# Patient Record
Sex: FEMALE | Race: WHITE
[De-identification: names, ages, dates, MRNs, and addresses within clinical notes are randomized per-mention and may not be internally consistent; named-entity substitution may affect disease eponyms.]

---

## 2017-12-16 ENCOUNTER — HOSPITAL ENCOUNTER (EMERGENCY)
Dept: HOSPITAL 41 - JD.ED | Age: 82
Discharge: HOME | End: 2017-12-16
Payer: MEDICARE

## 2017-12-16 DIAGNOSIS — Z79.899: ICD-10-CM

## 2017-12-16 DIAGNOSIS — Z88.0: ICD-10-CM

## 2017-12-16 DIAGNOSIS — Z88.8: ICD-10-CM

## 2017-12-16 DIAGNOSIS — M54.41: Primary | ICD-10-CM

## 2017-12-16 PROCEDURE — 73502 X-RAY EXAM HIP UNI 2-3 VIEWS: CPT

## 2017-12-16 PROCEDURE — 72100 X-RAY EXAM L-S SPINE 2/3 VWS: CPT

## 2017-12-16 PROCEDURE — 72131 CT LUMBAR SPINE W/O DYE: CPT

## 2017-12-16 PROCEDURE — 99284 EMERGENCY DEPT VISIT MOD MDM: CPT

## 2017-12-16 NOTE — EDM.PDOC
ED HPI GENERAL MEDICAL PROBLEM





- General


Chief Complaint: Back Pain or Injury


Stated Complaint: BACK PAIN


Time Seen by Provider: 12/16/17 14:17


Source of Information: Reports: Patient


History Limitations: Reports: No Limitations





- History of Present Illness


INITIAL COMMENTS - FREE TEXT/NARRATIVE: 





87 y/o F with back pain.  Has had pain for three years but it's been much worse 

for the past 2 weeks.  No known injury.  Pain is located in lower back and is 

worse on the R side.  Sometimes radiates down R leg.  Off and on, worse with 

movement, difficult to get out of bed in the morning due to pain.  No leg 

weakness or numbness. Is still able to walk.  Taking advil and cyclobenzaprine 

which provides partial relief.  Came to ED today because pain has been 

worsening.  No recent illness.


  ** Lower Back


Pain Score (Numeric/FACES): 7





- Related Data


 Allergies











Allergy/AdvReac Type Severity Reaction Status Date / Time


 


aspirin Allergy  Cannot Verified 12/16/17 14:19





   Remember  


 


paroxetine [From Paxil] Allergy  Cannot Verified 12/16/17 14:19





   Remember  


 


Penicillins Allergy  Cannot Verified 12/16/17 14:19





   Remember  











Home Meds: 


 Home Meds





Cyclobenzaprine [Flexeril] 5 mg PO ASDIRECTED PRN 12/16/17 [History]


Flecainide Acetate 50 mg PO BID 12/16/17 [History]


Metoprolol Succinate [Toprol XL] 25 mg PO DAILY 12/16/17 [History]


Non-Formulary Medication [NF Drug] 30 mcg PO DAILY 12/16/17 [History]


traMADol [Ultram] 50 mg PO TID PRN #30 tablet 12/16/17 [Rx]











Past Medical History


Cardiovascular History: Reports: Afib


Musculoskeletal History: Reports: Arthritis





- Past Surgical History


GI Surgical History: Reports: Appendectomy


Female  Surgical History: Reports: Hysterectomy, Other (See Below)


Other Female  Surgeries/Procedures: lumpectomy





Social & Family History





- Tobacco Use


Smoking Status *Q: Never Smoker


Second Hand Smoke Exposure: No





- Caffeine Use


Caffeine Use: Reports: Coffee





- Recreational Drug Use


Recreational Drug Use: No





ED ROS GENERAL





- Review of Systems


Review Of Systems: See Below


Constitutional: Reports: No Symptoms


Respiratory: Reports: No Symptoms


Cardiovascular: Reports: No Symptoms


GI/Abdominal: Reports: No Symptoms


Musculoskeletal: Reports: Back Pain


Neurological: Denies: Paresthesia, Difficulty Walking, Weakness





ED EXAM,LOWER BACK PAIN/INJURY





- Physical Exam


Exam: See Below


Exam Limited By: No Limitations


General Appearance: Alert, WD/WN, No Apparent Distress


Eye Exam: Bilateral Eye: Normal Inspection


Ears: Normal External Exam


Nose: Normal Inspection


Throat/Mouth: Normal Inspection, Normal Voice, No Airway Compromise


Head: Atraumatic, Normocephalic


Neck: Normal Inspection, Supple, Non-Tender, Full Range of Motion


Respiratory/Chest: No Respiratory Distress, Lungs Clear, Normal Breath Sounds


Cardiovascular: Normal Peripheral Pulses, Regular Rate, Rhythm, No Murmur


GI/Abdominal: Soft, Non-Tender, No Distention.  No: Rebound


Back Exam: Normal Inspection, Vertebral Tenderness (mild, diffuse lumbar area, 

no step-offs or deformities, skin normal.  +paraspinal TTP throughout lower 

lumbar area. straight leg raise neg bilat. )


Extremities: Normal Inspection, No Pedal Edema


Neurological: Alert, Normal Mood/Affect, Normal Dorsiflexion, Normal Plantar 

Flexion, No Motor/Sensory Deficits, Oriented x 3


Psychiatric: Normal Affect, Normal Mood


Skin Exam: Warm, Dry, Intact, Normal Color, No Rash





Course





- Vital Signs


Last Recorded V/S: 


 Last Vital Signs











Temp  36.8 C   12/16/17 14:16


 


Pulse  77   12/16/17 14:16


 


Resp  18   12/16/17 14:16


 


BP  140/81   12/16/17 14:16


 


Pulse Ox  96   12/16/17 14:16














- Orders/Labs/Meds


Orders: 


 Active Orders 24 hr











 Category Date Time Status


 


 Hip Min 2V or 3V w Pelvis Rt [CR] Stat Exams  12/16/17 14:59 Taken


 


 Lumbar Spine 2 or 3V [CR] Stat Exams  12/16/17 14:59 Taken











Meds: 


Medications














Discontinued Medications














Generic Name Dose Route Start Last Admin





  Trade Name Freq  PRN Reason Stop Dose Admin


 


Tramadol HCl  50 mg  12/16/17 14:59  12/16/17 15:04





  Ultram  PO  12/16/17 15:00  50 mg





  ONETIME ONE   Administration














- Re-Assessments/Exams


Free Text/Narrative Re-Assessment/Exam: 





12/16/17 17:22


XR L spine shows diffuse compression deformities and degenerative disease of 

lumbar spine.  CT scan also shows several compression deformities, acuity 

unknown.  Patient feels better after tramadol.  She had MRI scheduled for a 

couple of days ago but was in too much pain so cancelled it.  I encouraged her 

to f/u with her primary care provider this week to reschedule MRI and also 

discuss possible physical therapy.  meanwhile will rx tramadol, discussed risks 

at length and encouraged her to reserve it for severe pain symptoms. 





Departure





- Departure


Time of Disposition: 17:06


Disposition: Home, Self-Care 01


Clinical Impression: 


Back pain


Qualifiers:


 Back pain location: low back pain Chronicity: acute Back pain laterality: 

bilateral Sciatica presence: with sciatica Sciatica laterality: sciatica of 

right side Qualified Code(s): M54.41 - Lumbago with sciatica, right side








- Discharge Information


Prescriptions: 


traMADol [Ultram] 50 mg PO TID PRN #30 tablet


 PRN Reason: Pain


Instructions:  Back Pain, Adult


Referrals: 


Darlene Hill NP [Primary Care Provider] - 


Forms:  ED Department Discharge


Additional Instructions: 


1.  Take your usual medications for pain.  In addition, you may take Tylenol (

acetaminophen). For severe, pain, take Tramadol as prescribed.  


2.  Follow up with your primary care provider in [place], who can consider 

referring you to physical therapy and for an MRI. 





- My Orders


Last 24 Hours: 


My Active Orders





12/16/17 14:59


Hip Min 2V or 3V w Pelvis Rt [CR] Stat 


Lumbar Spine 2 or 3V [CR] Stat 














- Assessment/Plan


Last 24 Hours: 


My Active Orders





12/16/17 14:59


Hip Min 2V or 3V w Pelvis Rt [CR] Stat 


Lumbar Spine 2 or 3V [CR] Stat

## 2017-12-16 NOTE — CT
CT lumbar spine

 

Technique: Multiple axial sections were obtained from the top of T10 

inferiorly through the L5-S1 disc.  Reconstructed sagittal and coronal

 images were reviewed.

 

Comparison: No previous CT or MRI lumbar spine exam.

 

Findings:  Mild endplate concavity is seen within T11.  Severe 

compression deformity is noted of T12.  Moderate endplate concavities 

and compression deformity is noted within L2.  Severe compression 

deformity noted of L3.  Moderate compression deformity seen at L5.  I 

do not see a definite acute fracture line although compression 

deformities can occur without obvious fracture lines and MRI would be 

needed to evaluate for bone marrow edema to further age these 

compression deformities if clinically indicated.

 

Scoliosis is seen.

 

Mild retrolisthesis of the posterior vertebral line of T12 is seen by 

about 3.7 mm.  There is sufficient room within the central canal at 

T12 so this is felt to be incidental.  Diffuse circumferential disc 

bulging is seen throughout the lumbar spine and lower thoracic spine. 

 Degenerative apophyseal change noted throughout the lumbar spine 

which is most prominent at L4-L5 and L5-S1.  No significant central 

canal stenosis or neural foraminal stenosis is seen.

 

Impression:

1.  Scoliosis.

2.  Multiple compression deformities.  No definite acute fracture line

 is seen.  As mentioned above, MRI would be needed to look for bone 

marrow edema to rule out an acute compression deformity.

3.  Diffuse disc bulging with no definite areas of central or neural 

foraminal stenosis.

 

Diagnostic code #3

## 2017-12-18 NOTE — CR
Lumbar spine: AP, lateral and coned-down lateral views centered to the

 lumbosacral junction were obtained.

 

Comparison: No prior lumbar spine exam.

 

Compression deformities are seen of T12, L2, L3 and superior endplate 

concavity of L5.  Bony structures are osteoporotic.  Scoliosis is 

noted.  Calcifications are seen within the upper right abdomen 

possibly due to gallstones.  No abnormal subluxation is seen.  

Pedicles are intact.

 

Impression:

1.  Multiple compression deformities.  Age of these are indeterminate.

  MRI would be needed to further age these findings if clinically 

needed.

2.  Probable gallstones.

3.  Scoliosis and osteoporosis.

 

Diagnostic code #3

## 2017-12-18 NOTE — CR
Pelvis and right hip: AP view of the pelvis was obtained as well as AP

 and frog-leg lateral views of the right hip.

 

Comparison: No prior pelvis or hip exam.

 

Calcification overlying the sacrum is seen.  This is felt to represent

 calcification within lymph nodes and also felt to be incidental.  

Joint spaces within both hips are maintained.  Bony structures are 

osteopenic.  No acute fracture or other abnormality is identified.

 

Impression:

1.  Incidental findings.  Nothing acute is identified on AP pelvis or 

on two-view right hip exam.

 

Diagnostic code #2

## 2018-01-12 NOTE — HP
DATE OF ADMISSION:  01/18/2018

 

HISTORY OF PRESENT ILLNESS:

This is the first orthopedic outpatient admission for surgery for this 86-year-

old female who is being admitted with severe painful, osteoporotic compression

fracture of T12.  The patient suffered the injury in December 2017.  She was

placed on a conservative treatment program.  She has continued to have

increasing pain in the back area with the average pain scale of 9 to 10 with any

type of activity, her functional activity has decreased to less than 10% of

normal.  She also underwent an MRI evaluation which shows a fairly significant

osteoporotic compression fracture of T12 with a fluid, cystic formation within

the vertebral body itself, lending itself to an unstable fracture.  The patient

has been given the information on kyphoplasty and also a handout.  She

understands the procedure and has consented to it.

 

PAST MEDICAL HISTORY:

 

 

ALLERGIES:

Aspirin, Paxil, penicillin with hives, clindamycin.

 

PAST SURGICAL HISTORY:

Positive.  She has had multiple surgeries in the past with no anesthesia

complications or problems.

 

PAST MEDICAL HISTORY:

She has decreased thyroid function.  She has increased cholesterol.  She also

has a standing history of atrial fibrillation.

 

CURRENT MEDICATIONS:

Current medications include thyroid, metoprolol, tramadol for pain control, and

Tambocor.

 

BLEEDING HISTORY:

Negative.

 

BLOOD CLOT HISTORY:

Negative.

 

TOBACCO USE:

Nonsmoker.

 

ALCOHOL:

Nondrinker.

 

PHYSICAL EXAMINATION:

GENERAL:  Today, reveals a well-developed, well-nourished, 86-year-old female in

moderate-to-severe distress.

HEAD, EYES, EARS, NOSE AND THROAT:  Normocephalic.

NECK:  Supple.

CHEST:  Clear.

COR:  Shows a history of atrial fibrillation.

ABDOMEN:  Soft.

:  Intact.

MUSCULOSKELETAL:  Examination of the thoracolumbar spine reveals positive pain

to pressure palpation over the TL junction region.  Positive pain with

percussion.

 

RADIOGRAPHIC STUDIES:

MRI evaluation shows an acute compression fracture of T12 with cyst formation.

 

PLAN:

Plan will be for the patient undergo kyphoplasty fracture stabilization of the

T12 vertebral body-with a history of clindamycin allergy, I feel that we will go

ahead and stabilize this fracture with cement only with no antibiotic.

 

DD:  01/12/2018 10:34:32

DT:  01/12/2018 10:59:24  MMODAL

Job #:  [id number]

DONAVAN

## 2018-01-18 ENCOUNTER — HOSPITAL ENCOUNTER (OUTPATIENT)
Dept: HOSPITAL 41 - JD.SDS | Age: 83
Discharge: HOME | End: 2018-01-18
Attending: SPECIALIST
Payer: MEDICARE

## 2018-01-18 DIAGNOSIS — Z79.899: ICD-10-CM

## 2018-01-18 DIAGNOSIS — F41.9: ICD-10-CM

## 2018-01-18 DIAGNOSIS — Z88.8: ICD-10-CM

## 2018-01-18 DIAGNOSIS — Z90.710: ICD-10-CM

## 2018-01-18 DIAGNOSIS — I10: ICD-10-CM

## 2018-01-18 DIAGNOSIS — M19.90: ICD-10-CM

## 2018-01-18 DIAGNOSIS — Z88.6: ICD-10-CM

## 2018-01-18 DIAGNOSIS — Z90.722: ICD-10-CM

## 2018-01-18 DIAGNOSIS — Z88.0: ICD-10-CM

## 2018-01-18 DIAGNOSIS — Z98.890: ICD-10-CM

## 2018-01-18 DIAGNOSIS — E78.00: ICD-10-CM

## 2018-01-18 DIAGNOSIS — M80.88XA: Primary | ICD-10-CM

## 2018-01-18 DIAGNOSIS — I48.91: ICD-10-CM

## 2018-01-18 DIAGNOSIS — Z88.1: ICD-10-CM

## 2018-01-18 PROCEDURE — 93005 ELECTROCARDIOGRAM TRACING: CPT

## 2018-01-18 PROCEDURE — C1713 ANCHOR/SCREW BN/BN,TIS/BN: HCPCS

## 2018-01-18 PROCEDURE — 22513 PERQ VERTEBRAL AUGMENTATION: CPT

## 2018-01-18 NOTE — PCM.PREANE
Preanesthetic Assessment





- Anesthesia/Transfusion/Family Hx


Anesthesia History: Prior Anesthesia Without Reaction


Type of Anesthesia Reaction: Excessive Nausea/Vomiting


Family History of Anesthesia Reaction: No


Transfusion History: No Prior Transfusion(s)


Intubation History: Unknown





- Review of Systems


General: Weakness, Fatigue, Malaise


Pulmonary: No Symptoms


Cardiovascular: No Symptoms (History of HTN, atrial fibrillation, and 

tachycardia), Palpitations (History of Atrial fibrillation, currently in SR.)


Gastrointestinal: No Symptoms (GERD), Decreased Appetite


Neurological: No Symptoms (history of back pain/ history of motion sickness), 

Weakness


Other: Reports: Thyroid Problems (hypothyroid), Depression, Anxiety (History of 

anxiety )





- Physical Assessment


NPO Status Date: 01/17/18


NPO Status Time: 17:00


Pulse: 70


O2 Sat by Pulse Oximetry: 97


Respiratory Rate: 16


Blood Pressure: 130/80


Temperature: 36.7 C


Height: 1.55 m


Weight: 45 kg


ASA Class: 2


Mental Status: Alert & Oriented x3


Airway Class: Mallampati = 2


Dentition: Reports: Normal Dentition, Missing Tooth/Teeth, Caries


Thyro-Mental Finger Breadths: 3


Mouth Opening Finger Breadths: 3


ROM/Head Extension: Full


Lungs: Clear to Auscultation, Normal Respiratory Effort


Cardiovascular: Regular Rate, Regular Rhythm, No Murmurs





- Lab


Values: 





MRSA -





All lab values reviewed and noted and within acceptable ranges to proceed with 

scheduled procedure.





- Imaging/EKG


Impressions: 





EKG: SR rate= 74, borderline prolonged QT interval





- Allergies


Allergies/Adverse Reactions: 


 Allergies











Allergy/AdvReac Type Severity Reaction Status Date / Time


 


aspirin Allergy  Cannot Verified 01/17/18 15:42





   Remember  


 


paroxetine [From Paxil] Allergy  Cannot Verified 01/17/18 15:42





   Remember  


 


Penicillins Allergy  Cannot Verified 01/17/18 15:42





   Remember  


 


procaine [From Novocain] Allergy  Tachycardia Verified 01/17/18 15:42














- Anesthesia Plan


Pre-Op Medication Ordered: Beta Blocker


Beta Blocker: Metoprolol


Med Last Dose Date: 01/17/18


Med Last Dose Time: 06:00





- Acknowledgements


Anesthesia Type Planned: MAC (Local/Mac)


Pt an Appropriate Candidate for the Planned Anesthesia: Yes


Alternatives and Risks of Anesthesia Discussed w Pt/Guardian: Yes


Pt/Guardian Understands and Agrees with Anesthesia Plan: Yes





PreAnesthesia Questionnaire


HEENT History: Reports: Impaired Vision


Cardiovascular History: Reports: Afib, High Cholesterol, Hypertension, Other (

See Below)


Other Cardiovascular History: tachycardia


Respiratory History: Reports: None


Gastrointestinal History: Reports: Irritable Bowel Syndrome


OB/GYN History: Reports: None


Musculoskeletal History: Reports: Arthritis, Other (See Below)


Other Musculoskeletal History: compression fracture


Neurological History: Reports: None


Psychiatric History: Reports: Anxiety


Endocrine/Metabolic History: Reports: None


Hematologic History: Reports: Anemia


Immunologic History: Reports: None


Oncologic (Cancer) History: Reports: None


Dermatologic History: Reports: None





- Past Surgical History


Head Surgeries/Procedures: Reports: None


HEENT Surgical History: Reports: Cataract Surgery, Other (See Below)


Other HEENT Surgeries/Procedures: salivary stone extraction


Cardiovascular Surgical History: Reports: Varicose


Respiratory Surgical History: Reports: None


GI Surgical History: Reports: Appendectomy


Female  Surgical History: Reports: Hysterectomy, Oophorectomy, Other (See 

Below)


Other Female  Surgeries/Procedures: breast lumpectomy, lymph node removed 

from left arm


Endocrine Surgical History: Reports: None


Neurological Surgical History: Reports: None


Oncologic Surgical History: Reports: None


Dermatological Surgical History: Reports: None





- SUBSTANCE USE


Smoking Status *Q: Never Smoker


Tobacco Use Within Last Twelve Months: No


Second Hand Smoke Exposure: No


Recreational Drug Use History: No





- HOME MEDS


Home Medications: 


 Home Meds





Cyclobenzaprine [Flexeril] 5 mg PO ASDIRECTED PRN 12/16/17 [History]


Flecainide Acetate 50 mg PO BID 12/16/17 [History]


Metoprolol Succinate [Toprol XL] 25 mg PO DAILY 12/16/17 [History]


traMADol [Ultram] 50 mg PO TID PRN #30 tablet 12/16/17 [Rx]


Cholecalciferol (Vitamin D3) [Vitamin D3] 1,000 unit PO DAILY 01/17/18 [History]


Omega-3S/DHA/Epa/Fish Oil [Omega-3 Fish Oil 1,000 mg Sfgl] 1 cap PO DAILY 01/17/ 18 [History]


Thyroid [[place] Thyroid] 30 mg PO DAILY 01/17/18 [History]











- CURRENT (IN HOUSE) MEDS


Current Meds: 





 Current Medications





Lactated Ringer's (Ringers, Lactated)  1,000 mls @ 125 mls/hr IV ASDIRECTED DONNA


   Stop: 01/18/18 23:00


Lidocaine/Sodium Bicarbonate (Buffered Lidocaine 1% In Ns 8.4%)  0.25 ml .XX 

ONETIME PRN


   PRN Reason: Prior to IV Start


   Stop: 01/18/18 18:00


Sodium Chloride (Saline Flush)  10 ml FLUSH ASDIRECTED PRN


   PRN Reason: Keep Vein Open


   Stop: 01/18/18 18:00

## 2018-01-18 NOTE — OR
DATE OF OPERATION:  01/18/2018

 

SURGEON:  Ronald D Isackson, MD

 

PREOPERATIVE DIAGNOSIS:

Acute osteoporotic compression fracture, T12, intractable pain.

 

POSTOPERATIVE DIAGNOSIS:

Acute osteoporotic compression fracture, T12, intractable pain.

 

ANESTHESIA:

Sedation with 1% lidocaine with epinephrine.

 

OPERATION PERFORMED:

1. Kyphoplasty fracture stabilization at T12 vertebral body.

2. Biopsy of T12 vertebral body.

 

DESCRIPTION OF PROCEDURE:

The patient was taken to the operative room in a supine position, where she was

placed under light sedation and then transferred to the operating table in a

prone position.  Once the patient was positioned, the fluoroscopy was brought in

to identify the level of surgery at T12.  Once that was confirmed, the operation

proceeded with marking of the pedicles, and then prepping and draping was

carried out for the thoracic lumbar spine region for surgery by standard

technique.  After prepping and draping, the operation then proceeded.  By

fluoroscopic guidance, the local infiltration was carried over the skin on the

pedicles on the right and left side of T12 and then the infiltration, lidocaine

with epinephrine, was carried down to the pedicle itself, anesthetizing the area

on both sides.  Once that was completed, 2 small stab incisions were used on the

right and left sides.  Instrumentation with the kyphoplasty unit was then

carried out with the instruments being carried down through the pedicle into the

posterior aspect of the vertebral body.  Biopsy was then taken on both the right

and left sides, and a similar technique was used on the left side as the right

side.  Biopsies were taken on both sides and then the operation proceeded with

placement of balloons.  Approximately, 1.5 mL of inflation was carried out.

Immediate note, the posterior portion of vertebral body was fairly firm, but

once the center of the vertebral body was reached with a biopsy, there was

complete hollow and fluid was filling the vertebral body.  Once the balloons

were in place, the operation proceeded with placement of cement.  Approximately

2.5 mL of cement was placed on each side, totalling 5 mL.  This gave a very good

solid fill of the vertebral body, filling in the area that was fractured and,

especially, the hollow-type area that could be seen on the MRI.  Once the cement

had hardened, the instruments were withdrawn.  The final x-rays showed it to be

very satisfactory with good cement filling of the vertebral body itself.  The

operation then proceeded with closure of the skin with 3-0 Prolene.  Standard

dressings were applied.  The patient tolerated this procedure well.  She left

the operating room in a stable condition to room for recovery.

 

ESTIMATED BLOOD LOSS:

 

 

DD:  01/18/2018 09:02:06

DT:  01/18/2018 11:14:55  MMODAL

Job #:  [id number]

## 2018-01-18 NOTE — CR
Thoracic spine: Multiple fluoroscopic spot views were obtained 

centered to the T12 level.

 

Study shows vertebroplasty procedure.  Fluoroscopy time given as 432.9

 seconds.

 

Impression:

1.  Procedural study as noted above.

 

Diagnostic code #2

## 2018-02-12 ENCOUNTER — HOSPITAL ENCOUNTER (OUTPATIENT)
Dept: HOSPITAL 41 - JD.ED | Age: 83
Setting detail: OBSERVATION
LOS: 2 days | Discharge: HOME | End: 2018-02-14
Attending: INTERNAL MEDICINE | Admitting: INTERNAL MEDICINE
Payer: MEDICARE

## 2018-02-12 DIAGNOSIS — Z90.710: ICD-10-CM

## 2018-02-12 DIAGNOSIS — E03.9: ICD-10-CM

## 2018-02-12 DIAGNOSIS — G89.29: ICD-10-CM

## 2018-02-12 DIAGNOSIS — E78.5: ICD-10-CM

## 2018-02-12 DIAGNOSIS — M54.41: ICD-10-CM

## 2018-02-12 DIAGNOSIS — Z90.722: ICD-10-CM

## 2018-02-12 DIAGNOSIS — Z88.1: ICD-10-CM

## 2018-02-12 DIAGNOSIS — I48.91: ICD-10-CM

## 2018-02-12 DIAGNOSIS — Z98.890: ICD-10-CM

## 2018-02-12 DIAGNOSIS — Z88.4: ICD-10-CM

## 2018-02-12 DIAGNOSIS — Z79.899: ICD-10-CM

## 2018-02-12 DIAGNOSIS — K58.9: ICD-10-CM

## 2018-02-12 DIAGNOSIS — F41.9: ICD-10-CM

## 2018-02-12 DIAGNOSIS — Z88.0: ICD-10-CM

## 2018-02-12 DIAGNOSIS — M48.56XA: Primary | ICD-10-CM

## 2018-02-12 DIAGNOSIS — Z88.8: ICD-10-CM

## 2018-02-12 DIAGNOSIS — E87.6: ICD-10-CM

## 2018-02-12 DIAGNOSIS — Z88.6: ICD-10-CM

## 2018-02-12 DIAGNOSIS — N18.3: ICD-10-CM

## 2018-02-12 DIAGNOSIS — I12.9: ICD-10-CM

## 2018-02-12 PROCEDURE — 97116 GAIT TRAINING THERAPY: CPT

## 2018-02-12 PROCEDURE — 97162 PT EVAL MOD COMPLEX 30 MIN: CPT

## 2018-02-12 PROCEDURE — 96372 THER/PROPH/DIAG INJ SC/IM: CPT

## 2018-02-12 PROCEDURE — 97110 THERAPEUTIC EXERCISES: CPT

## 2018-02-12 PROCEDURE — 97530 THERAPEUTIC ACTIVITIES: CPT

## 2018-02-12 PROCEDURE — A9503 TC99M MEDRONATE: HCPCS

## 2018-02-12 PROCEDURE — 36415 COLL VENOUS BLD VENIPUNCTURE: CPT

## 2018-02-12 PROCEDURE — 72100 X-RAY EXAM L-S SPINE 2/3 VWS: CPT

## 2018-02-12 PROCEDURE — 99285 EMERGENCY DEPT VISIT HI MDM: CPT

## 2018-02-12 PROCEDURE — 96375 TX/PRO/DX INJ NEW DRUG ADDON: CPT

## 2018-02-12 PROCEDURE — 96374 THER/PROPH/DIAG INJ IV PUSH: CPT

## 2018-02-12 PROCEDURE — 72148 MRI LUMBAR SPINE W/O DYE: CPT

## 2018-02-12 PROCEDURE — G0378 HOSPITAL OBSERVATION PER HR: HCPCS

## 2018-02-12 PROCEDURE — 80048 BASIC METABOLIC PNL TOTAL CA: CPT

## 2018-02-12 PROCEDURE — 96376 TX/PRO/DX INJ SAME DRUG ADON: CPT

## 2018-02-12 PROCEDURE — 97165 OT EVAL LOW COMPLEX 30 MIN: CPT

## 2018-02-12 PROCEDURE — 85025 COMPLETE CBC W/AUTO DIFF WBC: CPT

## 2018-02-12 PROCEDURE — 78306 BONE IMAGING WHOLE BODY: CPT

## 2018-02-12 PROCEDURE — 80053 COMPREHEN METABOLIC PANEL: CPT

## 2018-02-12 PROCEDURE — 96361 HYDRATE IV INFUSION ADD-ON: CPT

## 2018-02-12 PROCEDURE — 83735 ASSAY OF MAGNESIUM: CPT

## 2018-02-12 NOTE — EDM.PDOC
ED HPI GENERAL MEDICAL PROBLEM





- General


Chief Complaint: Back Pain or Injury


Stated Complaint: [facility]


Time Seen by Provider: 02/12/18 16:48


Source of Information: Reports: Patient, RN Notes Reviewed





- History of Present Illness


INITIAL COMMENTS - FREE TEXT/NARRATIVE: 





86-year-old lady comes in with severe back pain. She had a kyphoplasty about 1 

month ago and did really well postoperatively she's been getting along quite 

well with minimal back discomfort up until about 2 or 3 days ago. She started 

having increasing low back discomfort 2 days ago which became much worse 

yesterday. That became even worse today to the point where family was unable to 

even help her get up and walk. For ambulance was called she has been 

transported here without further incident. The pain is in her low back with 

some radiation down toward the buttocks bilaterally. Her previous kyphoplasty 

was in her mid back. There's been no fall or injury. The pain is not bad at 

rest but quite severe with any type of motion.  she has been taking Advil 600 

mg once or twice a day. No cough, chest pain or difficulty breathing. No fever 

or chills. No abdominal pain nausea or vomiting.


  ** Bilateral Lower Back


Pain Score (Numeric/FACES): 10





- Related Data


 Allergies











Allergy/AdvReac Type Severity Reaction Status Date / Time


 


paroxetine [From Paxil] Allergy  Cannot Verified 02/12/18 16:56





   Remember  


 


Penicillins Allergy  Cannot Verified 02/12/18 16:56





   Remember  


 


aspirin AdvReac  Stomach Verified 02/12/18 16:56





   Upset  


 


clindamycin AdvReac  Weakness Verified 02/14/18 08:13


 


procaine [From Novocain] AdvReac  Tachycardia Verified 02/12/18 16:56











Home Meds: 


 Home Meds





Cyclobenzaprine [Flexeril] 5 mg PO ASDIRECTED PRN 12/16/17 [History]


Flecainide Acetate 50 mg PO BID 12/16/17 [History]


Metoprolol Succinate [Toprol XL] 25 mg PO DAILY 12/16/17 [History]


traMADol [Ultram] 50 mg PO TID PRN #30 tablet 12/16/17 [Rx]


Cholecalciferol (Vitamin D3) [Vitamin D3] 1,000 unit PO DAILY 01/17/18 [History]


Omega-3S/DHA/Epa/Fish Oil [Omega-3 Fish Oil 1,000 mg Sfgl] 1 cap PO DAILY 01/17/ 18 [History]


Thyroid [Manson Thyroid] 30 mg PO DAILY 01/17/18 [History]


Calcium Carbonate [Tums Extra Strength] 1 tab PO ASDIRECTED PRN 02/12/18 [

History]


Hypromellose [Genteal Mild] 1 drop EYEBOTH DAILY 02/12/18 [History]


Celecoxib [CeleBREX] 100 mg PO BID #40 cap 02/14/18 [Rx]











Past Medical History


HEENT History: Reports: Impaired Vision


Cardiovascular History: Reports: Afib, High Cholesterol, Hypertension, Other (

See Below)


Other Cardiovascular History: tachycardia


Respiratory History: Reports: None


Gastrointestinal History: Reports: Irritable Bowel Syndrome


OB/GYN History: Reports: None


Musculoskeletal History: Reports: Arthritis, Other (See Below)


Other Musculoskeletal History: compression fracture


Neurological History: Reports: None


Psychiatric History: Reports: Anxiety


Endocrine/Metabolic History: Reports: Hypothyroidism


Hematologic History: Reports: Anemia


Immunologic History: Reports: None


Oncologic (Cancer) History: Reports: None


Dermatologic History: Reports: None





- Past Surgical History


Head Surgeries/Procedures: Reports: None


HEENT Surgical History: Reports: Cataract Surgery, Other (See Below)


Other HEENT Surgeries/Procedures: salivary stone extraction


Cardiovascular Surgical History: Reports: Varicose


Respiratory Surgical History: Reports: None


GI Surgical History: Reports: Appendectomy


Female  Surgical History: Reports: Hysterectomy, Oophorectomy, Other (See 

Below)


Other Female  Surgeries/Procedures: breast lumpectomy, lymph node removed 

from left arm


Endocrine Surgical History: Reports: None


Neurological Surgical History: Reports: None


Musculoskeletal Surgical History: Reports: Other (See Below)


Other Musculoskeletal Surgeries/Procedures:: kyphoplasty


Oncologic Surgical History: Reports: None


Dermatological Surgical History: Reports: None





Social & Family History





- Family History


Family Medical History: Noncontributory





- Tobacco Use


Smoking Status *Q: Never Smoker


Second Hand Smoke Exposure: No





- Caffeine Use


Caffeine Use: Reports: Coffee





- Recreational Drug Use


Recreational Drug Use: No





ED ROS GENERAL





- Review of Systems


Review Of Systems: See Below


Constitutional: Denies: Fever, Chills, Diaphoresis


HEENT: Denies: Throat Pain


Respiratory: Denies: Shortness of Breath


Cardiovascular: Denies: Chest Pain


GI/Abdominal: Denies: Abdominal Pain, Nausea, Vomiting


: Reports: No Symptoms


Musculoskeletal: Reports: Back Pain (Severe low back discomfort radiating to 

buttocks bilaterally, worse with motion)


Skin: Reports: No Symptoms.  Denies: Rash


Neurological: Denies: Numbness, Tingling





ED EXAM,LOWER BACK PAIN/INJURY





- Physical Exam


Exam: See Below


General Appearance: Alert, Mild Distress, Other (Severe pain with any type of 

motion such as sitting up for exam of back)


Eye Exam: Bilateral Eye: PERRL


Throat/Mouth: Normal Inspection


Head: Atraumatic


Neck: Supple, Full Range of Motion


Respiratory/Chest: No Respiratory Distress, Lungs Clear, Normal Breath Sounds


Cardiovascular: Regular Rate, Rhythm


GI/Abdominal: Soft, Non-Tender


Back Exam: Other (Small pinpoint scars mid back area of prior kyphoplasty of 

healed well, no unusual swelling or erythema).  No: Paraspinal Tenderness, 

Vertebral Tenderness


Extremities: Normal Inspection, Normal Range of Motion.  No: Leg Pain, 

Increased Warmth, Redness


Neurological: Alert, No Motor/Sensory Deficits


Skin Exam: Warm, Dry, Normal Color





Course





- Vital Signs


Last Recorded V/S: 


 Last Vital Signs











Temp  97.9 F   02/14/18 10:00


 


Pulse  69   02/14/18 10:00


 


Resp  16   02/14/18 10:00


 


BP  117/75   02/14/18 10:00


 


Pulse Ox  93 L  02/14/18 10:00














- Orders/Labs/Meds


Orders: 


 Medication Orders





Hydrocodone Bitart/Acetaminophen (Norco 325-5 Mg)  1 tab PO Q6H PRN


   PRN Reason: Pain


Bisacodyl (Dulcolax)  5 mg PO DAILY PRN


   PRN Reason: Constipation


Celecoxib (Celebrex)  100 mg PO BID [facility]


   Last Admin: 02/14/18 09:57  Dose: 100 mg


Cholecalciferol (Vitamin D3)  1,000 units PO DAILY [facility]


   Last Admin: 02/14/18 09:58  Dose: 1,000 units


   Admin: 02/13/18 10:27  Dose: 1,000 units


Cyclobenzaprine HCl (Flexeril)  2.5 - 5 mg PO Q8H PRN


   PRN Reason: Muscle Spasm


Docusate Sodium (Colace)  100 mg PO BID PRN


   PRN Reason: Constipation


Enoxaparin Sodium (Lovenox)  30 mg SUBCUT DAILY [facility]


   Last Admin: 02/14/18 09:58  Dose: 30 mg


   Admin: 02/13/18 11:20  Dose: 30 mg


Fish Oil (Fish Oil)  1 gm PO DAILY [facility]


   Last Admin: 02/14/18 09:58  Dose: 1 gm


   Admin: 02/13/18 10:25  Dose: 1 gm


Flecainide Acetate (Tambocor)  50 mg PO BID [facility]


   Last Admin: 02/14/18 09:58  Dose: 50 mg


   Admin: 02/13/18 22:23  Dose: 50 mg


   Admin: 02/13/18 10:28  Dose:  


   Admin: 02/13/18 10:25  Dose: 50 mg


Hydromorphone HCl (Dilaudid)  1 mg IVPUSH Q4H PRN


   PRN Reason: Pain


Metoprolol Succinate (Toprol Xl)  25 mg PO DAILY [facility]


   Last Admin: 02/14/18 09:58  Dose: 25 mg


   Admin: 02/13/18 10:26  Dose: 25 mg


Ondansetron HCl (Zofran Odt)  4 mg PO Q6H PRN


   PRN Reason: nausea, able to take PO


Ondansetron HCl (Zofran)  4 mg IV Q6H PRN


   PRN Reason: Nausea/Vomiting


Hypromellose [ (Genteal Mild] 1 Drop)  0 each EYEBOTH DAILY [facility]


   Last Admin: 02/14/18 09:58  Dose: 2 each


   Admin: 02/13/18 10:26 Dose:  Not Given


Manson Thyroid 30 Mg  0 each PO DAILY [facility]


   Last Admin: 02/14/18 09:58  Dose:  


   Admin: 02/13/18 10:26  Dose:  


Calcium Carbonate 1 (Tab)  0 each PO Q4H PRN


   PRN Reason: HEARTBURN


Polyethylene Glycol (Miralax)  17 gm PO DAILY PRN


   PRN Reason: Constipation


Senna/Docusate Sodium (Senna Plus)  1 tab PO BID PRN


   PRN Reason: Constipation


Sodium Chloride (Saline Flush)  10 ml FLUSH ASDIRECTED PRN


   PRN Reason: Keep Vein Open


   Last Admin: 02/12/18 17:41  Dose: 10 ml


Tramadol HCl (Ultram)  50 mg PO TID PRN


   PRN Reason: Pain


   Last Admin: 02/14/18 11:44  Dose: 50 mg


   Admin: 02/14/18 04:31  Dose: 50 mg


   Admin: 02/13/18 18:09  Dose: 50 mg








Labs: 


 Laboratory Tests











  02/12/18 02/12/18 Range/Units





  17:42 17:42 


 


WBC  6.19   (3.98-10.04)  K/mm3


 


RBC  4.15   (3.98-5.22)  M/mm3


 


Hgb  12.5   (11.2-15.7)  gm/L


 


Hct  38.3   (34.1-44.9)  %


 


MCV  92.3   (79.4-94.8)  fl


 


MCH  30.1   (25.6-32.2)  pg


 


MCHC  32.6   (32.2-35.5)  g/dl


 


RDW Std Deviation  45.9   (36.4-46.3)  fL


 


Plt Count  203   (182-369)  K/mm3


 


MPV  10.0   (9.4-12.3)  fl


 


Neut % (Auto)  61.9   (34.0-71.1)  %


 


Lymph % (Auto)  21.6   (19.3-51.7)  %


 


Mono % (Auto)  13.6 H   (4.7-12.5)  %


 


Eos % (Auto)  2.4   (0.7-5.8)  


 


Baso % (Auto)  0.3   (0.1-1.2)  %


 


Neut # (Auto)  3.83   (1.56-6.13)  K/mm3


 


Lymph # (Auto)  1.34   (1.18-3.74)  K/mm3


 


Mono # (Auto)  0.84 H   (0.24-0.36)  K/mm3


 


Eos # (Auto)  0.15   (0.04-0.36)  K/mm3


 


Baso # (Auto)  0.02   (0.01-0.08)  K/mm3


 


Sodium   141  (136-145)  mEq/L


 


Potassium   3.1 L  (3.5-5.1)  mEq/L


 


Chloride   104  ()  mEq/L


 


Carbon Dioxide   25  (21-32)  mEq/L


 


Anion Gap   15.1 H  (5-15)  


 


BUN   15  (7-18)  mg/dL


 


Creatinine   1.1 H  (0.55-1.02)  mg/dL


 


Est Cr Clr Drug Dosing   27.70  mL/min


 


Estimated GFR (MDRD)   47  (>60)  mL/min


 


BUN/Creatinine Ratio   13.6 L  (14-18)  


 


Glucose   109  ()  mg/dL


 


Calcium   9.2  (8.5-10.1)  mg/dL


 


Total Bilirubin   0.5  (0.2-1.0)  mg/dL


 


AST   22  (15-37)  U/L


 


ALT   16  (14-59)  U/L


 


Alkaline Phosphatase   72  ()  U/L


 


Total Protein   7.6  (6.4-8.2)  g/dl


 


Albumin   3.1 L  (3.4-5.0)  g/dl


 


Globulin   4.5  gm/dL


 


Albumin/Globulin Ratio   0.7 L  (1-2)  











Meds: 


Medications











Generic Name Dose Route Start Last Admin





  Trade Name Freq  PRN Reason Stop Dose Admin


 


Hydrocodone Bitart/Acetaminophen  1 tab  02/12/18 20:21  





  Norco 325-5 Mg  PO   





  Q6H PRN   





  Pain   


 


Bisacodyl  5 mg  02/13/18 18:27  





  Dulcolax  PO   





  DAILY PRN   





  Constipation   


 


Celecoxib  100 mg  02/14/18 09:00  02/14/18 09:57





  Celebrex  PO   100 mg





  BID DONNA   Administration


 


Cholecalciferol  1,000 units  02/13/18 09:00  02/14/18 09:58





  Vitamin D3  PO   1,000 units





  DAILY DONNA   Administration


 


Cyclobenzaprine HCl  2.5 - 5 mg  02/12/18 20:18  





  Flexeril  PO   





  Q8H PRN   





  Muscle Spasm   


 


Docusate Sodium  100 mg  02/13/18 18:27  





  Colace  PO   





  BID PRN   





  Constipation   


 


Enoxaparin Sodium  30 mg  02/13/18 11:00  02/14/18 09:58





  Lovenox  SUBCUT   30 mg





  DAILY DONNA   Administration


 


Fish Oil  1 gm  02/13/18 09:00  02/14/18 09:58





  Fish Oil  PO   1 gm





  DAILY DONNA   Administration


 


Flecainide Acetate  50 mg  02/12/18 21:00  02/14/18 09:58





  Tambocor  PO   50 mg





  BID DONNA   Administration


 


Hydromorphone HCl  1 mg  02/12/18 20:20  





  Dilaudid  IVPUSH   





  Q4H PRN   





  Pain   


 


Metoprolol Succinate  25 mg  02/13/18 09:00  02/14/18 09:58





  Toprol Xl  PO   25 mg





  DAILY DONNA   Administration


 


Ondansetron HCl  4 mg  02/13/18 18:27  





  Zofran Odt  PO   





  Q6H PRN   





  nausea, able to take PO   


 


Ondansetron HCl  4 mg  02/13/18 18:27  





  Zofran  IV   





  Q6H PRN   





  Nausea/Vomiting   


 


Hypromellose [  0 each  02/13/18 09:00  02/14/18 09:58





  Genteal Mild] 1 Drop  EYEBOTH   2 each





  DAILY DONNA   Administration


 


Manson Thyroid 30 Mg  0 each  02/13/18 09:00  02/14/18 09:58





  PO   Not Given





  DAILY DONNA   


 


Calcium Carbonate 1  0 each  02/14/18 08:14  





  Tab  PO   





  Q4H PRN   





  HEARTBURN   


 


Polyethylene Glycol  17 gm  02/13/18 18:27  





  Miralax  PO   





  DAILY PRN   





  Constipation   


 


Senna/Docusate Sodium  1 tab  02/13/18 18:27  





  Senna Plus  PO   





  BID PRN   





  Constipation   


 


Sodium Chloride  10 ml  02/12/18 17:13  02/12/18 17:41





  Saline Flush  FLUSH   10 ml





  ASDIRECTED PRN   Administration





  Keep Vein Open   


 


Tramadol HCl  50 mg  02/12/18 20:18  02/14/18 11:44





  Ultram  PO   50 mg





  TID PRN   Administration





  Pain   














Discontinued Medications














Generic Name Dose Route Start Last Admin





  Trade Name Freq  PRN Reason Stop Dose Admin


 


Acetaminophen  650 mg  02/12/18 17:32  02/12/18 17:41





  Tylenol  PO  02/12/18 17:33  650 mg





  NOW ONE   Administration


 


Hydromorphone HCl  0.25 mg  02/12/18 17:13  02/12/18 17:42





  Dilaudid  IVPUSH  02/12/18 17:14  Not Given





  ONETIME ONE   


 


Hydromorphone HCl  Confirm  02/12/18 17:26  02/12/18 17:43





  Dilaudid  Administered  02/12/18 17:27  0.25 mg





  Dose   Administration





  0.5 mg   





  .ROUTE   





  .STK-MED ONE   


 


Sodium Chloride  1,000 mls @ 50 mls/hr  02/13/18 13:15  02/13/18 13:41





  Sodium Chloride 0.45%  IV  02/14/18 01:15  50 mls/hr





  ASDIRECTED DONNA   Administration


 


Ketorolac Tromethamine  15 mg  02/12/18 20:23  02/14/18 02:00





  Toradol  IVPUSH   15 mg





  Q8H PRN   Administration





  Pain   


 


Lorazepam  0.25 mg  02/12/18 17:33  02/12/18 17:41





  Ativan  PO  02/12/18 17:34  0.25 mg





  ONETIME ONE   Administration


 


Calcium Carbonate 1  0 tab  02/12/18 20:18  





  Tab  PO   





  Q4H PRN   





  HEARTBURN   


 


Ondansetron HCl  4 mg  02/12/18 17:16  02/12/18 17:43





  Zofran  IVPUSH  02/12/18 17:17  4 mg





  ONETIME ONE   Administration


 


Pneumococcal 13-Valent Conj Vacc  0.5 ml  02/14/18 12:09  02/14/18 13:29





  Prevnar 13  IM  02/14/18 12:10  Not Given





  .ONCE ONE   


 


Pneumococcal Polyvalent Vaccine  0.5 ml  02/13/18 11:00  02/14/18 13:29





  Pneumovax 23  IM  02/13/18 11:01  Not Given





  .ONCE ONE   


 


Potassium Chloride  40 meq  02/13/18 10:30  02/13/18 22:22





  Klor-Con M20  PO  02/14/18 23:00  40 meq





  BID DONNA   Administration














- Re-Assessments/Exams


Free Text/Narrative Re-Assessment/Exam: 





02/12/18 18:44


X-rays of the low back do show the area of prior kyphoplasty. There are severe 

degenerative changes up and down her spine. Lumbar spine not well visualized 

due to a lot of overlying shadows. It does look like there is probable 

compression deformity of L3 and also L1.


02/12/18 18:46. We have given 0.25 milligrams Dilaudid IV, Ativan 0.25 mg IV, 

Tylenol 650 mg by mouth. With that our nurses did have her to the bathroom a 

short time ago. Watch her transferring from wheelchair to her bed she did not 

do well with that at all, had quite a lot of severe pain even with 2 staff 

nurses helping her. Look like she is a candidate to go home tonight. She lives 

alone. Family could be with her some of the time tonight but she will have 

nobody with her tomorrow. Therefore I'm going to see if we can possibly get her 

admitted observation status for pain management and control, Orthopedic consult 

in the morning.














Departure





- Departure


Time of Disposition: 19:04


Disposition: Refer to Observation


Condition: Poor


Clinical Impression: 


Low back pain


Qualifiers:


 Chronicity: acute Back pain laterality: midline Sciatica presence: without 

sciatica Qualified Code(s): M54.5 - Low back pain








- Discharge Information





ED Communication





- Discussed Case With (1)


Discussed Case With (1): Admitting Provider (Dr. Woodall, decision to admit at 

about 19:00.)

## 2018-02-12 NOTE — PCM.HP
H&P History of Present Illness





- General


Date of Service: 02/12/18


Source of Information: Family, Provider


History Limitations: Reports: No Limitations





- History of Present Illness


Initial Comments - Free Text/Narative: 











86 year old female with history of chronic back pain, presents with acute back 

without response to analgesics in the ED.  She has had a kyphoplasty involving 

T12 since her last ED visit in December 2017.  She has had lower back pain for 3

-4 days without relief from OTC NSAIDS.  The pain occurs with movement, and is 

described as a sharp pain into her buttocks.  She denies recent trauma or heavy 

lifting.  There has been no fever/chills, or changed in GI/ habits. The 

patient will be admitted to OBS with telemetry.


Onset of Symptoms: Reports: Sudden


Symptom Onset Date: 02/09/18


Duration of Symptoms: Reports: Day(s):, Getting Worse


Location: Reports: Back


Quality: Reports: Same as Previous Episode


Severity: Moderate


Improves with: Reports: Medication


Worsens with: Reports: Movement


Associated Symptoms: Reports: Weakness


  ** Bilateral Lower Back


Pain Score (Numeric/FACES): 10





- Related Data


Allergies/Adverse Reactions: 


 Allergies











Allergy/AdvReac Type Severity Reaction Status Date / Time


 


clindamycin Allergy  Weakness Verified 02/12/18 16:56


 


paroxetine [From Paxil] Allergy  Cannot Verified 02/12/18 16:56





   Remember  


 


Penicillins Allergy  Cannot Verified 02/12/18 16:56





   Remember  


 


aspirin AdvReac  Stomach Verified 02/12/18 16:56





   Upset  


 


procaine [From Novocain] AdvReac  Tachycardia Verified 02/12/18 16:56











Home Medications: 


 Home Meds





Cyclobenzaprine [Flexeril] 5 mg PO ASDIRECTED PRN 12/16/17 [History]


Flecainide Acetate 50 mg PO BID 12/16/17 [History]


Metoprolol Succinate [Toprol XL] 25 mg PO DAILY 12/16/17 [History]


traMADol [Ultram] 50 mg PO TID PRN #30 tablet 12/16/17 [Rx]


Cholecalciferol (Vitamin D3) [Vitamin D3] 1,000 unit PO DAILY 01/17/18 [History]


Omega-3S/DHA/Epa/Fish Oil [Omega-3 Fish Oil 1,000 mg Sfgl] 1 cap PO DAILY 01/17/ 18 [History]


Thyroid [[place] Thyroid] 30 mg PO DAILY 01/17/18 [History]


Calcium Carbonate [Tums Extra Strength] 1 tab PO ASDIRECTED PRN 02/12/18 [

History]


Hypromellose [Genteal Mild] 1 drop EYEBOTH DAILY 02/12/18 [History]


Ibuprofen [Advil] 600 mg PO BID 02/12/18 [History]











Past Medical History


HEENT History: Reports: Impaired Vision


Cardiovascular History: Reports: Afib, High Cholesterol, Hypertension, Other (

See Below)


Other Cardiovascular History: tachycardia


Respiratory History: Reports: None


Gastrointestinal History: Reports: Irritable Bowel Syndrome


OB/GYN History: Reports: None


Musculoskeletal History: Reports: Arthritis, Other (See Below)


Other Musculoskeletal History: compression fracture


Neurological History: Reports: None


Psychiatric History: Reports: Anxiety


Endocrine/Metabolic History: Reports: Hypothyroidism


Hematologic History: Reports: Anemia


Immunologic History: Reports: None


Oncologic (Cancer) History: Reports: None


Dermatologic History: Reports: None





- Past Surgical History


Head Surgeries/Procedures: Reports: None


HEENT Surgical History: Reports: Cataract Surgery, Other (See Below)


Other HEENT Surgeries/Procedures: salivary stone extraction


Cardiovascular Surgical History: Reports: Varicose


Respiratory Surgical History: Reports: None


GI Surgical History: Reports: Appendectomy


Female  Surgical History: Reports: Hysterectomy, Oophorectomy, Other (See 

Below)


Other Female  Surgeries/Procedures: breast lumpectomy, lymph node removed 

from left arm


Endocrine Surgical History: Reports: None


Neurological Surgical History: Reports: None


Musculoskeletal Surgical History: Reports: Other (See Below)


Other Musculoskeletal Surgeries/Procedures:: kyphoplasty


Oncologic Surgical History: Reports: None


Dermatological Surgical History: Reports: None





Social & Family History





- Family History


Family Medical History: Noncontributory





- Tobacco Use


Smoking Status *Q: Never Smoker


Second Hand Smoke Exposure: No





- Caffeine Use


Caffeine Use: Reports: Coffee





- Recreational Drug Use


Recreational Drug Use: No





H&P Review of Systems





- Review of Systems:


Review Of Systems: See Below


General: Reports: Weakness


HEENT: Reports: No Symptoms


Pulmonary: Reports: No Symptoms


Cardiovascular: Reports: No Symptoms


Gastrointestinal: Reports: No Symptoms


Genitourinary: Reports: No Symptoms


Musculoskeletal: Reports: Back Pain (lumbar with radiation)


Skin: Reports: No Symptoms


Psychiatric: Reports: No Symptoms


Neurological: Reports: Difficulty Walking, Weakness


Hematologic/Lymphatic: Reports: No Symptoms


Immunologic: Reports: No Symptoms





Exam





- Exam


Exam: See Below





- Vital Signs


Vital Signs: 


 Last Vital Signs











Temp  36.8 C   02/12/18 16:49


 


Pulse  83   02/12/18 16:49


 


Resp  18   02/12/18 16:49


 


BP  177/94 H  02/12/18 16:49


 


Pulse Ox  98   02/12/18 16:49











Weight: 50.349 kg





- Exam


Quality Assessment: Supplemental Oxygen


General: Alert, Oriented, Cooperative


HEENT: Conjunctiva Clear, Pupils Equal, Pupils Reactive


Neck: Trachea Midline


Lungs: Normal Respiratory Effort, Decreased Breath Sounds


Cardiovascular: Regular Rate


GI/Abdominal Exam: Normal Bowel Sounds, Soft, Non-Tender, No Organomegaly, No 

Distention


 (Female) Exam: Deferred


Rectal (Female) Exam: Deferred


Back Exam: Normal Inspection, Paraspinal Tenderness


Extremities: Normal Inspection


Skin: Warm


Neurological: Cranial Nerves Intact


Neuro Extensive - Mental Status: Alert, Oriented x3, Normal Mood/Affect, Normal 

Cognition, Memory Intact


Neuro Extensive - Motor, Sensory, Reflexes: CN II-XII Intact





- Patient Data


Result Diagrams: 


 02/12/18 17:42





 02/12/18 17:42





*Q Meaningful Use (ADM)





- VTE *Q


VTE Criteria *Q: 








- Stroke *Q


Stroke Criteria *Q: 








- AMI *Q


AMI Criteria *Q: 








- Problem List


(1) Hypertension


SNOMED Code(s): 32289388


   ICD Code: I10 - ESSENTIAL (PRIMARY) HYPERTENSION   Status: Acute   Current 

Visit: Yes   





(2) Hyperlipidemia


SNOMED Code(s): 68425086


   ICD Code: E78.5 - HYPERLIPIDEMIA, UNSPECIFIED   Status: Acute   Current Visit

: Yes   





(3) Hypothyroid


SNOMED Code(s): 71804116


   ICD Code: E03.9 - HYPOTHYROIDISM, UNSPECIFIED   Status: Acute   Current Visit

: Yes   





(4) Irritable bowel syndrome (IBS)


SNOMED Code(s): 68849961


   ICD Code: K58.9 - IRRITABLE BOWEL SYNDROME WITHOUT DIARRHEA   Status: Acute 

  Current Visit: Yes   





(5) Compression fracture


SNOMED Code(s): 330451204


   ICD Code: HQJ0317 -    Status: Acute   Current Visit: Yes   





(6) Low back pain


SNOMED Code(s): 250442964


   ICD Code: M54.5 - LOW BACK PAIN   Status: Acute   Current Visit: Yes   


Qualifiers: 


   Chronicity: acute   Back pain laterality: midline   Sciatica presence: 

without sciatica   Qualified Code(s): M54.5 - Low back pain   





(7) Back pain


SNOMED Code(s): 950717186


   ICD Code: M54.9 - DORSALGIA, UNSPECIFIED   Status: Acute   Current Visit: No

   


Qualifiers: 


   Back pain location: low back pain   Chronicity: acute   Back pain laterality

: bilateral   Sciatica presence: with sciatica   Sciatica laterality: sciatica 

of right side   Qualified Code(s): M54.41 - Lumbago with sciatica, right side   


Problem List Initiated/Reviewed/Updated: Yes


Orders Last 24hrs: 


 Medication Orders





Sodium Chloride (Saline Flush)  10 ml FLUSH ASDIRECTED PRN


   PRN Reason: Keep Vein Open


   Last Admin: 02/12/18 17:41  Dose: 10 ml








Assessment/Plan Comment:: 











Impression:





History of compression fractures (lumbar) with acute back pain.


ED visit for back pain 12/2017 with subsequent kyphoplasty


Unresponsive to analgesics in the ED.





CKD stage III





Electrolyte abnormalities














Chronic


A Fib


HLD


HTN


IBS


Hypothyroidism














Plan:





IVF


IV NSAIDS


Analgesics narcotic/nonnarcotic


Ortho surg consult with Dr Isackson


Daily Labs


Home meds


DVT/GI prophylaxis

## 2018-02-13 RX ADMIN — POTASSIUM CHLORIDE SCH MEQ: 1500 TABLET, EXTENDED RELEASE ORAL at 22:22

## 2018-02-13 RX ADMIN — VITAMIN D, TAB 1000IU (100/BT) SCH UNITS: 25 TAB at 10:27

## 2018-02-13 RX ADMIN — KETOROLAC TROMETHAMINE PRN MG: 15 INJECTION, SOLUTION INTRAMUSCULAR; INTRAVENOUS at 02:06

## 2018-02-13 RX ADMIN — Medication SCH: at 10:26

## 2018-02-13 RX ADMIN — KETOROLAC TROMETHAMINE PRN MG: 15 INJECTION, SOLUTION INTRAMUSCULAR; INTRAVENOUS at 10:10

## 2018-02-13 RX ADMIN — FLECAINIDE ACETATE SCH MG: 50 TABLET ORAL at 10:25

## 2018-02-13 RX ADMIN — KETOROLAC TROMETHAMINE PRN MG: 15 INJECTION, SOLUTION INTRAMUSCULAR; INTRAVENOUS at 18:09

## 2018-02-13 RX ADMIN — FLECAINIDE ACETATE SCH: 50 TABLET ORAL at 10:28

## 2018-02-13 RX ADMIN — FLECAINIDE ACETATE SCH MG: 50 TABLET ORAL at 22:23

## 2018-02-13 RX ADMIN — POTASSIUM CHLORIDE SCH MEQ: 1500 TABLET, EXTENDED RELEASE ORAL at 11:20

## 2018-02-13 RX ADMIN — OMEGA-3 FATTY ACIDS CAP 1000 MG SCH GM: 1000 CAP at 10:25

## 2018-02-13 NOTE — CR
Lumbar spine: AP and lateral views of the lumbar spine were obtained.

 

Comparison: Prior lumbar spine study of 01/12/18.

 

Numerous compression deformities noted throughout the lower thoracic 

and lumbar spine.  Previous vertebroplasty is noted at T12 which is an

 interval change from previous exam.  Scoliosis is seen.  Osteoporosis

 is noted.  Nothing acute is appreciated from prior lumbar spine 

study.

 

Impression:

1.  Numerous compression deformities as well as scoliosis.  Findings 

are fairly similar to previous exam.

2.  Interval kyphoplasty is noted within the T12 vertebral body.

3.  Nothing acute is appreciated.

 

Diagnostic code #3

## 2018-02-13 NOTE — PCM.PN
- General Info


Date of Service: 02/13/18


Admission Dx/Problem (Free Text): 


Intractable back pain


Subjective Update: 


In to see Arlette. She is resting in bed. She reports her pain is controlled when 

lying in bed but is aggravated by movement. She rates her pain at 5/10 

currently. She is due for some pain medications. Dr. Isackson is consulted and 

nursing reports he has requested a lumbar spine MRI and will see the patient 

tomorrow. She denies any concerns. Labs are ordered for tomorrow. 





Functional Status: Reports: Pain Controlled, Tolerating Diet, Ambulating, 

Urinating.  Denies: New Symptoms





- Review of Systems


General: Reports: No Symptoms.  Denies: Fever, Weakness, Fatigue, Malaise


HEENT: Reports: No Symptoms.  Denies: Ear Pain, Eye Pain, Sinus Congestion, 

Sore Throat, Rhinitis


Pulmonary: Reports: No Symptoms.  Denies: Shortness of Breath, Pleuritic Chest 

Pain, Cough, Sputum


Cardiovascular: Reports: No Symptoms.  Denies: Chest Pain, Palpitations, 

Dyspnea on Exertion, Edema


Gastrointestinal: Reports: No Symptoms.  Denies: Abdominal Pain, Constipation, 

Diarrhea, Nausea, Vomiting


Genitourinary: Reports: No Symptoms


Musculoskeletal: Reports: Back Pain (5/10).  Denies: Neck Pain, Shoulder Pain, 

Arm Pain, Leg Pain, Joint Pain


Skin: Reports: No Symptoms


Neurological: Reports: No Symptoms


Psychiatric: Reports: No Symptoms





- Patient Data


Vitals - Most Recent: 


 Last Vital Signs











Temp  97.9 F   02/13/18 12:46


 


Pulse  75   02/13/18 12:46


 


Resp  17   02/13/18 12:46


 


BP  99/69   02/13/18 12:46


 


Pulse Ox  94 L  02/13/18 12:46











Weight - Most Recent: 111 lb


I&O - Last 24 Hours: 


 Intake & Output











 02/12/18 02/13/18 02/13/18





 22:59 06:59 14:59


 


Intake Total  100 180


 


Output Total   200


 


Balance  100 -20











Med Orders - Current: 


 Current Medications





Hydrocodone Bitart/Acetaminophen (Norco 325-5 Mg)  1 tab PO Q6H PRN


   PRN Reason: Pain


Cholecalciferol (Vitamin D3)  1,000 units PO DAILY [facility]


   Last Admin: 02/13/18 10:27 Dose:  1,000 units


Cyclobenzaprine HCl (Flexeril)  2.5 - 5 mg PO Q8H PRN


   PRN Reason: Muscle Spasm


Enoxaparin Sodium (Lovenox)  30 mg SUBCUT DAILY [facility]


   Last Admin: 02/13/18 11:20 Dose:  30 mg


Fish Oil (Fish Oil)  1 gm PO DAILY [facility]


   Last Admin: 02/13/18 10:25 Dose:  1 gm


Flecainide Acetate (Tambocor)  50 mg PO BID [facility]


   Last Admin: 02/13/18 10:28 Dose:  Not Given


Hydromorphone HCl (Dilaudid)  1 mg IVPUSH Q4H PRN


   PRN Reason: Pain


Sodium Chloride (Sodium Chloride 0.45%)  1,000 mls @ 50 mls/hr IV ASDIRECTED [facility]


   Stop: 02/14/18 01:15


   Last Admin: 02/13/18 13:41 Dose:  50 mls/hr


Ketorolac Tromethamine (Toradol)  15 mg IVPUSH Q8H PRN


   PRN Reason: Pain


   Last Admin: 02/13/18 10:10 Dose:  15 mg


Metoprolol Succinate (Toprol Xl)  25 mg PO DAILY [facility]


   Last Admin: 02/13/18 10:26 Dose:  25 mg


Calcium Carbonate 1 (Tab)  0 tab PO Q4H PRN


   PRN Reason: HEARTBURN


Hypromellose [ (Genteal Mild] 1 Drop)  0 each EYEBOTH DAILY [facility]


   Last Admin: 02/13/18 10:26 Dose:  Not Given


[place] Thyroid 30 Mg  0 each PO DAILY [facility]


   Last Admin: 02/13/18 10:26 Dose:  Not Given


Potassium Chloride (Klor-Con M20)  40 meq PO BID [facility]


   Stop: 02/14/18 23:00


   Last Admin: 02/13/18 11:20 Dose:  40 meq


Sodium Chloride (Saline Flush)  10 ml FLUSH ASDIRECTED PRN


   PRN Reason: Keep Vein Open


   Last Admin: 02/12/18 17:41 Dose:  10 ml


Tramadol HCl (Ultram)  50 mg PO TID PRN


   PRN Reason: Pain





Discontinued Medications





Acetaminophen (Tylenol)  650 mg PO NOW ONE


   Stop: 02/12/18 17:33


   Last Admin: 02/12/18 17:41 Dose:  650 mg


Hydromorphone HCl (Dilaudid)  0.25 mg IVPUSH ONETIME ONE


   Stop: 02/12/18 17:14


   Last Admin: 02/12/18 17:42 Dose:  Not Given


Hydromorphone HCl (Dilaudid) Confirm Administered Dose 0.5 mg .ROUTE .STK-MED 

ONE


   Stop: 02/12/18 17:27


   Last Admin: 02/12/18 17:43 Dose:  0.25 mg


Lorazepam (Ativan)  0.25 mg PO ONETIME ONE


   Stop: 02/12/18 17:34


   Last Admin: 02/12/18 17:41 Dose:  0.25 mg


Ondansetron HCl (Zofran)  4 mg IVPUSH ONETIME ONE


   Stop: 02/12/18 17:17


   Last Admin: 02/12/18 17:43 Dose:  4 mg


Pneumococcal Polyvalent Vaccine (Pneumovax 23)  0.5 ml IM .ONCE ONE


   Stop: 02/13/18 11:01











- Exam


Quality Assessment: DVT Prophylaxis


General: Alert, Oriented, Cooperative, Mild Distress (with movement )


HEENT: Pupils Equal, Pupils Reactive, EOMI, Mucous Membr. Moist/Pink


Neck: Supple, Trachea Midline, No JVD


Lungs: Clear to Auscultation, Normal Respiratory Effort


Cardiovascular: Regular Rate, Regular Rhythm


GI/Abdominal Exam: Normal Bowel Sounds, Soft, Non-Tender, No Organomegaly, No 

Distention, No Abnormal Bruit, No Mass, Pelvis Stable


 (Female) Exam: Deferred


Back Exam: Decreased Range of Motion, Other (severe pain with movement )


Extremities: Normal Inspection, Normal Range of Motion, Non-Tender, Normal 

Capillary Refill, Pedal Edema (trace )


Peripheral Pulses: 1+: Radial (L), Radial (R), Posterior Tibial (L), Posterior 

Tibial (R), Dorsalis Pedis (L), Dorsalis Pedis (R)


Skin: Warm, Dry, Intact


Neurological: No New Focal Deficit


Psy/Mental Status: Alert, Normal Affect, Normal Mood





- Problem List & Annotations


(1) Compression fracture


SNOMED Code(s): 447417284


   Code(s): QGB8838 -    Status: Acute   Priority: High   Current Visit: Yes   





(2) Hyperlipidemia


SNOMED Code(s): 37409002


   Code(s): E78.5 - HYPERLIPIDEMIA, UNSPECIFIED   Status: Chronic   Priority: 

Low   Current Visit: Yes   


Qualifiers: 


   Hyperlipidemia type: unspecified   Qualified Code(s): E78.5 - Hyperlipidemia

, unspecified   





(3) Hypertension


SNOMED Code(s): 74908886


   Code(s): I10 - ESSENTIAL (PRIMARY) HYPERTENSION   Status: Chronic   Priority

: Low   Current Visit: No   





(4) Hypothyroid


SNOMED Code(s): 60258081


   Code(s): E03.9 - HYPOTHYROIDISM, UNSPECIFIED   Status: Chronic   Priority: 

Low   Current Visit: No   


Qualifiers: 


   Hypothyroidism type: unspecified   Qualified Code(s): E03.9 - Hypothyroidism

, unspecified   





(5) Irritable bowel syndrome (IBS)


SNOMED Code(s): 56789838


   Code(s): K58.9 - IRRITABLE BOWEL SYNDROME WITHOUT DIARRHEA   Status: Chronic

   Priority: Low   Current Visit: No   


Qualifiers: 


   Irritable bowel syndrome type: unspecified   Qualified Code(s): K58.9 - 

Irritable bowel syndrome without diarrhea   





(6) Back pain


SNOMED Code(s): 952659574


   Code(s): M54.9 - DORSALGIA, UNSPECIFIED   Status: Acute   Priority: High   

Current Visit: Yes   


Qualifiers: 


   Back pain location: low back pain   Chronicity: acute   Back pain laterality

: bilateral   Sciatica presence: with sciatica   Sciatica laterality: sciatica 

of right side   Qualified Code(s): M54.41 - Lumbago with sciatica, right side   





(7) Hypokalemia


SNOMED Code(s): 19066160


   Code(s): E87.6 - HYPOKALEMIA   Status: Acute   Priority: High   Current Visit

: Yes   





- Problem List Review


Problem List Initiated/Reviewed/Updated: Yes





- Plan


Plan:: 


Impression:





History of compression fractures (lumbar) with acute back pain.


ED visit for back pain 12/2017 with subsequent kyphoplasty


Unresponsive to analgesics in the ED.





CKD stage III





Electrolyte abnormalities - supplement 








Chronic:


A Fib


HLD


HTN


IBS


Hypothyroidism








Plan:





IVF


IV NSAIDS


Analgesics narcotic/nonnarcotic


Ortho surg consult with Dr Isackson - MRI tomorrow AM


Daily Labs


Home meds


DVT/GI prophylaxis





Code status:Full code; PCP: Darlene rea Bear

## 2018-02-14 RX ADMIN — VITAMIN D, TAB 1000IU (100/BT) SCH UNITS: 25 TAB at 09:58

## 2018-02-14 RX ADMIN — Medication SCH EACH: at 09:58

## 2018-02-14 RX ADMIN — FLECAINIDE ACETATE SCH MG: 50 TABLET ORAL at 09:58

## 2018-02-14 RX ADMIN — OMEGA-3 FATTY ACIDS CAP 1000 MG SCH GM: 1000 CAP at 09:58

## 2018-02-14 RX ADMIN — KETOROLAC TROMETHAMINE PRN MG: 15 INJECTION, SOLUTION INTRAMUSCULAR; INTRAVENOUS at 02:00

## 2018-02-14 NOTE — PCM.DCSUM1
**Discharge Summary





- Hospital Course


HPI Initial Comments: 


86 year old female with history of chronic back pain, presents with acute back 

without response to analgesics in the ED.  She has had a kyphoplasty involving 

T12 since her last ED visit in December 2017.  She has had lower back pain for 3

-4 days without relief from OTC NSAIDS.  The pain occurs with movement, and is 

described as a sharp pain into her buttocks.  She denies recent trauma or heavy 

lifting.  There has been no fever/chills, or changed in GI/ habits. The 

patient will be admitted to OBS with telemetry.








- Discharge Data


Discharge Date: 02/14/18 (Admit Date: 2/12/18)


Discharge Disposition: Home, Self-Care 01


Condition: Fair





- Discharge Diagnosis/Problem(s)


(1) Compression fracture


SNOMED Code(s): 777338238


   ICD Code: RGC4349 -    Status: Acute   Priority: High   Current Visit: Yes   





(2) Hyperlipidemia


SNOMED Code(s): 17960790


   ICD Code: E78.5 - HYPERLIPIDEMIA, UNSPECIFIED   Status: Chronic   Priority: 

Low   Current Visit: Yes   


Qualifiers: 


   Hyperlipidemia type: unspecified   Qualified Code(s): E78.5 - Hyperlipidemia

, unspecified   





(3) Hypertension


SNOMED Code(s): 48730109


   ICD Code: I10 - ESSENTIAL (PRIMARY) HYPERTENSION   Status: Chronic   Priority

: Low   Current Visit: No   





(4) Hypothyroid


SNOMED Code(s): 89891689


   ICD Code: E03.9 - HYPOTHYROIDISM, UNSPECIFIED   Status: Chronic   Priority: 

Low   Current Visit: No   


Qualifiers: 


   Hypothyroidism type: unspecified   Qualified Code(s): E03.9 - Hypothyroidism

, unspecified   





(5) Irritable bowel syndrome (IBS)


SNOMED Code(s): 27848915


   ICD Code: K58.9 - IRRITABLE BOWEL SYNDROME WITHOUT DIARRHEA   Status: 

Chronic   Priority: Low   Current Visit: No   


Qualifiers: 


   Irritable bowel syndrome type: unspecified   Qualified Code(s): K58.9 - 

Irritable bowel syndrome without diarrhea   





(6) Back pain


SNOMED Code(s): 821123826


   ICD Code: M54.9 - DORSALGIA, UNSPECIFIED   Status: Acute   Priority: High   

Current Visit: Yes   


Qualifiers: 


   Back pain location: low back pain   Chronicity: acute   Back pain laterality

: bilateral   Sciatica presence: with sciatica   Sciatica laterality: sciatica 

of right side   Qualified Code(s): M54.41 - Lumbago with sciatica, right side   





(7) Hypokalemia


SNOMED Code(s): 28152901


   ICD Code: E87.6 - HYPOKALEMIA   Status: Resolved   Priority: High   Current 

Visit: Yes   





- Patient Summary/Data


Consults: 


 Consultations





02/13/18 11:29


Consult to Occupational Therapy [OT Evaluation and Treatment] [CONS] Routine 


Consult to Physical Therapy [PT Evaluation and Treatment] [CONS] Routine 





02/13/18 11:30


Consult to Physician [CONS] Routine 











Labs Pending at D/C: 


None





Recommended Follow-up Testing/Procedures: 


Follow-up with PCP in 7-10 days 





Dr. Isackson would like to see the patient one week after discharge. Nursing 

will attempt to schedule.





Hospital Course: 


Impression:





History of compression fractures (lumbar) with acute back pain.


ED visit for back pain 12/2017 with subsequent kyphoplasty


Unresponsive to analgesics in the ED.





CKD stage III





Electrolyte abnormalities - supplement 








Chronic:


A Fib


HLD


HTN


IBS


Hypothyroidism








Plan:





IVF


IV NSAIDS


Analgesics narcotic/nonnarcotic


Ortho surg consult with Dr Isackson - MRI tomorrow AM


Daily Labs


Home meds


DVT/GI prophylaxis





Code status:Full code; [facility]. New compression fractures 

were noted above and below her recent kyphoplasty location.  Dr. Dimas was 

in to see the patient.  She was started on Celebrex 100 mg twice a day.  Her 

pain here has been worsened by movement, however she is refusing most pain 

meds.  She does have Ultram and Flexeril at home which she should continue to 

utilize.  Home health physical therapy has been ordered.  Dr. Dimas would 

like to see the patient within 7 days.  Consult radiology report for bone scan 

and MRI for full report.  She will be discharged home today.  She has a ride 

lined up tonight.  She should see her primary care provider within 7-10 days 

after discharge.








- Patient Instructions


Diet: Heart Healthy Diet


Activity: As Tolerated


Driving: Do Not Drive


Notify Provider of: Fever, Increased Pain, Nausea and/or Vomiting





- Discharge Plan


Prescriptions/Med Rec: 


Celecoxib [CeleBREX] 100 mg PO BID #40 cap


Home Medications: 


 Home Meds





Cyclobenzaprine [Flexeril] 5 mg PO ASDIRECTED PRN 12/16/17 [History]


Flecainide Acetate 50 mg PO BID 12/16/17 [History]


Metoprolol Succinate [Toprol XL] 25 mg PO DAILY 12/16/17 [History]


traMADol [Ultram] 50 mg PO TID PRN #30 tablet 12/16/17 [Rx]


Cholecalciferol (Vitamin D3) [Vitamin D3] 1,000 unit PO DAILY 01/17/18 [History]


Omega-3S/DHA/Epa/Fish Oil [Omega-3 Fish Oil 1,000 mg Sfgl] 1 cap PO DAILY 01/17/ 18 [History]


Thyroid [[place] Thyroid] 30 mg PO DAILY 01/17/18 [History]


Calcium Carbonate [Tums Extra Strength] 1 tab PO ASDIRECTED PRN 02/12/18 [

History]


Hypromellose [Genteal Mild] 1 drop EYEBOTH DAILY 02/12/18 [History]


Celecoxib [CeleBREX] 100 mg PO BID #40 cap 02/14/18 [Rx]








Referrals: 


Darlene Hill NP [Primary Care Provider] - 





- Discharge Summary/Plan Comment


DC Time >30 min.: Yes (40 minutes )





- General Info


Date of Service: 02/14/18


Admission Dx/Problem (Free Text: 


Intractable back pain


Subjective Update: 


In to see Arlette today.  She reports her back pain is 8 out of 10 however she 

was just moving around and recently returned from bone scan and MRI.  She 

reports she did have some difficulty lying on the table for her scans and is 

increased her back pain.  She does have a noted compression fracture above and 

below the site of her previous kyphoplasty.  Dr. Dimas was in to see the 

patient and would like to see her within 7 days.  I discussed results of the 

scans with the patient.  She voiced understanding.  She was started on Celebrex 

today.  She reports she has been on this in the past but stopped due to some 

stomach issues.  She has been taking ibuprofen regularly. We'll stop that for 

now.  She has home tramadol and Flexeril and she was encouraged to utilize 

this. I also encouraged her to stay on top of her pain with pain medications 

until she can meet with Dr. Isackson. She reports she tries to avoid 

medications if at all possible and we had a lengthy discussion about quality of 

like and pain with movement. She was happy to have answers and a plan in place.





Functional Status: Reports: Pain Controlled (8/10 but refusing pain meds, 

reports she was just up and that made pain worse. ), Tolerating Diet, Ambulating

, Urinating, New Symptoms





- Review of Systems


General: Reports: No Symptoms


HEENT: Reports: No Symptoms


Pulmonary: Reports: No Symptoms


Cardiovascular: Reports: No Symptoms


Genitourinary: Reports: No Symptoms


Musculoskeletal: Reports: Back Pain (lower back - no radiation )


Skin: Reports: No Symptoms


Neurological: Reports: No Symptoms


Psychiatric: Reports: No Symptoms





- Patient Data


Vitals - Most Recent: 


 Last Vital Signs











Temp  97.9 F   02/14/18 10:00


 


Pulse  69   02/14/18 10:00


 


Resp  16   02/14/18 10:00


 


BP  117/75   02/14/18 10:00


 


Pulse Ox  93 L  02/14/18 10:00











Weight - Most Recent: 103 lb 11.2 oz


I&O - Last 24 hours: 


 Intake & Output











 02/13/18 02/14/18 02/14/18





 22:59 06:59 14:59


 


Intake Total 820 480 


 


Output Total  900 


 


Balance 820 -420 











Lab Results - Last 24 hrs: 


 Laboratory Results - last 24 hr











  02/14/18 02/14/18 Range/Units





  04:59 04:59 


 


WBC  4.95   (3.98-10.04)  K/mm3


 


RBC  3.39 L   (3.98-5.22)  M/mm3


 


Hgb  10.3 L   (11.2-15.7)  gm/L


 


Hct  31.8 L   (34.1-44.9)  %


 


MCV  93.8   (79.4-94.8)  fl


 


MCH  30.4   (25.6-32.2)  pg


 


MCHC  32.4   (32.2-35.5)  g/dl


 


RDW Std Deviation  46.6 H   (36.4-46.3)  fL


 


Plt Count  189   (182-369)  K/mm3


 


MPV  10.6   (9.4-12.3)  fl


 


Neut % (Auto)  51.3   (34.0-71.1)  %


 


Lymph % (Auto)  27.1   (19.3-51.7)  %


 


Mono % (Auto)  13.3 H   (4.7-12.5)  %


 


Eos % (Auto)  7.5 H   (0.7-5.8)  


 


Baso % (Auto)  0.6   (0.1-1.2)  %


 


Neut # (Auto)  2.54   (1.56-6.13)  K/mm3


 


Lymph # (Auto)  1.34   (1.18-3.74)  K/mm3


 


Mono # (Auto)  0.66 H   (0.24-0.36)  K/mm3


 


Eos # (Auto)  0.37 H   (0.04-0.36)  K/mm3


 


Baso # (Auto)  0.03   (0.01-0.08)  K/mm3


 


Sodium   138  (136-145)  mEq/L


 


Potassium   5.0  (3.5-5.1)  mEq/L


 


Chloride   107  ()  mEq/L


 


Carbon Dioxide   23  (21-32)  mEq/L


 


Anion Gap   13.0  (5-15)  


 


BUN   14  (7-18)  mg/dL


 


Creatinine   0.9  (0.55-1.02)  mg/dL


 


Est Cr Clr Drug Dosing   33.32  mL/min


 


Estimated GFR (MDRD)   59  (>60)  mL/min


 


BUN/Creatinine Ratio   15.6  (14-18)  


 


Glucose   84  ()  mg/dL


 


Calcium   8.4 L  (8.5-10.1)  mg/dL


 


Magnesium   1.9  (1.8-2.4)  mg/dl











Med Orders - Current: 


 Current Medications





Hydrocodone Bitart/Acetaminophen (Norco 325-5 Mg)  1 tab PO Q6H PRN


   PRN Reason: Pain


Bisacodyl (Dulcolax)  5 mg PO DAILY PRN


   PRN Reason: Constipation


Celecoxib (Celebrex)  100 mg PO BID [facility]


   Last Admin: 02/14/18 09:57 Dose:  100 mg


Cholecalciferol (Vitamin D3)  1,000 units PO DAILY [facility]


   Last Admin: 02/14/18 09:58 Dose:  1,000 units


Cyclobenzaprine HCl (Flexeril)  2.5 - 5 mg PO Q8H PRN


   PRN Reason: Muscle Spasm


Docusate Sodium (Colace)  100 mg PO BID PRN


   PRN Reason: Constipation


Enoxaparin Sodium (Lovenox)  30 mg SUBCUT DAILY [facility]


   Last Admin: 02/14/18 09:58 Dose:  30 mg


Fish Oil (Fish Oil)  1 gm PO DAILY [facility]


   Last Admin: 02/14/18 09:58 Dose:  1 gm


Flecainide Acetate (Tambocor)  50 mg PO BID [facility]


   Last Admin: 02/14/18 09:58 Dose:  50 mg


Hydromorphone HCl (Dilaudid)  1 mg IVPUSH Q4H PRN


   PRN Reason: Pain


Metoprolol Succinate (Toprol Xl)  25 mg PO DAILY [facility]


   Last Admin: 02/14/18 09:58 Dose:  25 mg


Ondansetron HCl (Zofran Odt)  4 mg PO Q6H PRN


   PRN Reason: nausea, able to take PO


Ondansetron HCl (Zofran)  4 mg IV Q6H PRN


   PRN Reason: Nausea/Vomiting


Hypromellose [ (Genteal Mild] 1 Drop)  0 each EYEBOTH DAILY [facility]


   Last Admin: 02/14/18 09:58 Dose:  2 each


[place] Thyroid 30 Mg  0 each PO DAILY [facility]


   Last Admin: 02/14/18 09:58 Dose:  Not Given


Calcium Carbonate 1 (Tab)  0 each PO Q4H PRN


   PRN Reason: HEARTBURN


Polyethylene Glycol (Miralax)  17 gm PO DAILY PRN


   PRN Reason: Constipation


Senna/Docusate Sodium (Senna Plus)  1 tab PO BID PRN


   PRN Reason: Constipation


Sodium Chloride (Saline Flush)  10 ml FLUSH ASDIRECTED PRN


   PRN Reason: Keep Vein Open


   Last Admin: 02/12/18 17:41 Dose:  10 ml


Tramadol HCl (Ultram)  50 mg PO TID PRN


   PRN Reason: Pain


   Last Admin: 02/14/18 11:44 Dose:  50 mg





Discontinued Medications





Acetaminophen (Tylenol)  650 mg PO NOW ONE


   Stop: 02/12/18 17:33


   Last Admin: 02/12/18 17:41 Dose:  650 mg


Hydromorphone HCl (Dilaudid)  0.25 mg IVPUSH ONETIME ONE


   Stop: 02/12/18 17:14


   Last Admin: 02/12/18 17:42 Dose:  Not Given


Hydromorphone HCl (Dilaudid) Confirm Administered Dose 0.5 mg .ROUTE .STK-MED 

ONE


   Stop: 02/12/18 17:27


   Last Admin: 02/12/18 17:43 Dose:  0.25 mg


Sodium Chloride (Sodium Chloride 0.45%)  1,000 mls @ 50 mls/hr IV ASDIRECTED [facility]


   Stop: 02/14/18 01:15


   Last Admin: 02/13/18 13:41 Dose:  50 mls/hr


Ketorolac Tromethamine (Toradol)  15 mg IVPUSH Q8H PRN


   PRN Reason: Pain


   Last Admin: 02/14/18 02:00 Dose:  15 mg


Lorazepam (Ativan)  0.25 mg PO ONETIME ONE


   Stop: 02/12/18 17:34


   Last Admin: 02/12/18 17:41 Dose:  0.25 mg


Calcium Carbonate 1 (Tab)  0 tab PO Q4H PRN


   PRN Reason: HEARTBURN


Ondansetron HCl (Zofran)  4 mg IVPUSH ONETIME ONE


   Stop: 02/12/18 17:17


   Last Admin: 02/12/18 17:43 Dose:  4 mg


Pneumococcal 13-Valent Conj Vacc (Prevnar 13)  0.5 ml IM .ONCE ONE


   Stop: 02/14/18 12:10


   Last Admin: 02/14/18 13:29 Dose:  Not Given


Pneumococcal Polyvalent Vaccine (Pneumovax 23)  0.5 ml IM .ONCE ONE


   Stop: 02/13/18 11:01


   Last Admin: 02/14/18 13:29 Dose:  Not Given


Potassium Chloride (Klor-Con M20)  40 meq PO BID [facility]


   Stop: 02/14/18 23:00


   Last Admin: 02/13/18 22:22 Dose:  40 meq











- Exam


Quality Assessment: Reports: DVT Prophylaxis


General: Reports: Alert, Oriented, Cooperative, No Acute Distress


HEENT: Reports: Pupils Equal, Pupils Reactive, EOMI, Mucous Membr. Moist/Pink


Neck: Reports: Supple, Trachea Midline, No JVD


Lungs: Reports: Clear to Auscultation, Normal Respiratory Effort


Cardiovascular: Reports: Regular Rate, Regular Rhythm


GI/Abdominal Exam: Normal Bowel Sounds, Soft, Non-Tender, No Organomegaly, No 

Distention, No Abnormal Bruit, No Mass, Pelvis Stable


 (Female) Exam: Deferred


Rectal (Female) Exam: Deferred


Back Exam: Reports: Decreased Range of Motion, Paraspinal Tenderness (lower 

back ), Vertebral Tenderness (lower back ), Other (Increased pain with movement 

)


Extremities: Normal Inspection, Normal Range of Motion, Non-Tender, No Pedal 

Edema, Normal Capillary Refill


Skin: Reports: Warm, Dry, Intact


Neurological: Reports: No New Focal Deficit


Psy/Mental Status: Reports: Alert, Normal Affect, Normal Mood





*Q Meaningful Use (DIS)





- VTE *Q


VTE Criteria *Q: 








- Stroke *Q


Stroke Criteria *Q: 








- AMI *Q


AMI Criteria *Q:

## 2018-02-14 NOTE — NM
Bone scan

 

Technique: 20.8 mCi of technetium 99m MDP was given intravenously.  

Whole body scintigraphic images were obtained.

 

Findings: Increased activity is seen in a linear orientation within 

approximately T11 and T12.  Lesser linear activity is seen within the 

superior body of approximately L3 and L2.  Findings are compatible 

with change from compression deformities.  

 

Scoliosis is noted.  Mild activity within both wrists are seen 

compatible with degenerative change.  

 

Small area of activity is seen within the lower right ribs at the 

costochondral junction most likely due to old trauma.  

 

No other bone scan abnormality is seen.

 

Impression:

1.  Linear activity compatible with compression deformities within 

approximately T11 and T12 and and approximately the superior vertebral

 bodies of L2 and L3.

2.  Scoliosis and slight degenerative signal change within both 

wrists.

3.  Increased activity most likely from old trauma within the 

costochondral junction within the right lower chest.

 

Diagnostic code #3

## 2018-02-14 NOTE — MR
MRI lumbar spine

 

Technique: T1-weighted axial images were obtained from the bottom of 

T10 inferiorly through the L5-S1 disc.  T2-weighted axial images were 

obtained from the mid T12 level inferiorly through the L5-S1 disc.  

T1, T2 and fat-suppressed inversion recovery sagittal images were 

obtained.

 

Comparison: Previous MRI lumbar spine study of 12/20/17.

 

Findings:

T10-T11: Posterior disc is preserved.  No discrete neural foraminal 

stenosis is noted.  No central canal stenosis is seen.

 

T11-T12: Mild to moderate compression deformity within superior 

endplate of T11 is seen.  This is more prominent than on prior study 

and shows diffuse bone marrow edema.  Posterior disc is preserved.  No

 central canal stenosis or discrete neural foraminal stenosis is seen.

 

T12-L1: Severe compression deformity of T12 is seen with low signal 

compatible with vertebroplasty cement.  There is posterior 

retrolisthesis of the vertebral line at T12 by about 6 mm.  Findings 

are stable from prior study causing minimal central canal stenosis.  

Neural foramina appear to be patent where the nerve roots exit.

 

L1-L2: Superior compression deformity of L1 is seen.  This is an 

interval change from previous exam and shows bone marrow edema.  

Posterior disc is preserved.  No central canal stenosis is seen.  

Neural foramina are patent where the nerve roots exit.

 

L2-L3: Compression deformity of L2 is seen.  This is stable from 

previous exam.  Mild retrolisthesis of the posterior vertebral line is

 seen which is stable.  Minimal circumferential disc bulge is noted.  

No central canal stenosis is seen.  Mild degenerative apophyseal 

change is noted.  Neural foramina are patent where the nerve roots 

exit.

 

L3-L4: Severe compression deformity of L3 is seen which is stable.  

Minimal circumferential disc bulge is seen.  Posterior disc maintains 

a minimally concave margin.  Neural foramina are patent where the 

nerve roots exit.  No central canal stenosis is seen.  Mild 

degenerative apophyseal change is noted.

 

L4-L5: Slight circumferential disc bulge is seen.  Posterior disc 

maintains a planar margin.  Mild posterior spurring is noted off the 

superior L5 vertebral margin.  Degenerative apophyseal change is 

noted.  No central canal stenosis is seen.  Neural foramina are patent

 where the nerve roots exit.

 

L5-S1: Compression deformity of L5 is seen which is stable from prior 

exam.  Posterior disc is preserved.  Severe degenerative apophyseal 

change is noted.  No central canal stenosis is seen.  Neural foramina 

are patent where the nerve roots exit.

 

Conus medullaris and cauda equina show no abnormal signal or mass.

 

Impression:

1.  Moderate compression deformity of L1 which is an interval change 

from prior study showing bone marrow edema.

2.  Mild increasing compression deformity of superior endplate of T11 

from prior study showing mild bone marrow edema.

3.  Other compression deformities are stable.

4.  Degenerative change as noted above.

 

Diagnostic code #3

## 2018-02-15 NOTE — CONS
CONSULTING PHYSICIAN:  Ronald D Isackson, MD

DATE OF CONSULTATION:  02/14/2018

 

REPORT TITLE:

Orthopedic Consultation

 

 

HISTORY OF PRESENT ILLNESS:

This patient is being seen on consult in the hospital for onset of increasing

pain in her lower lumbar spine.  The patient notes no specific injury to the low

back area.  She has had a history of previous low back problems and also a

compression fracture that was treated by kyphoplasty approximately 1 month ago.

The patient notes that she was last seen through the orthopedic office and was

doing fine until over the last 2 weeks, she has had positive increasing pain in

the low back with movement.  It seems the patient has had no injury, but notes

the pain is in the low back, extending into her buttocks.  The pain is increased

when she rises from a lying down or sitting position to standing position.  She

can walk.  She notes no increased pain in her low back area.  She also notes

that when she is at rest lying flat, she notes no pain.  There is some

increasing pain with prolonged sitting, but the major problem is the activity of

getting up and getting down at this point.  The patient notes no numbness,

tingling, or radiation of pain down into her lower extremities at this point.

She notes no fever, chills, or nighttime sweats.  Currently, the patient states

that she has been working with Flexeril and also Tylenol for pain control and

occasional Advil for inflammation control.

 

ALLERGIES:

Clindamycin, paroxetine, penicillins, aspirin, and Novocain.

 

MEDICATIONS:

The patient's current home medications include Flexeril, flecainide acetate 50

mg, Toprol, Tramadol, vitamin D3, multiple vitamins, thyroid, and also

ibuprofen.

 

REVIEW OF SYSTEMS:

HEAD, EYES, EARS, NOSE, AND THROAT:  She notes positive impaired vision.

CARDIOVASCULAR:  She has a history of atrial fibrillation, high cholesterol,

hypertension, and also tachycardia.

RESPIRATORY:  Notes no pneumonia or asthma history onset.

GASTROINTESTINAL:  She does have irritable bowel syndrome.

GENITOURINARY:  The patient notes no fever, chills, or any type of burning-type

sensation on urinating.

 

PAST SURGICAL HISTORY:

The patient has had previous surgeries with cataract surgery in the past.  Also,

she has had salivary stone extraction and varicose veins.  She notes that she

has had previous breast lumpectomies, lymph node resections, hysterectomy,

oophorectomy, and also a kyphoplasty fracture stabilization on 01/18/2018.

 

PHYSICAL EXAMINATION:

GENERAL:  On examination today, the patient is a thin, very tired-looking

female, but alert to questions and answers.

HEAD, EYES, EARS, NOSE, AND THROAT:  Normocephalic.

NECK:  Supple.

CHEST:  Clear.

CARDIOVASCULAR:  No cardiovascular changes.  Regular rate and rhythm are noted.

ABDOMEN:  Soft.

GENITOURINARY:  Intact.

BACK:  The patient has positive pain in the lower lumbar spine area with

palpation and pressure.  The patient also has previous healed surgical

kyphoplasty area which, on evaluation with palpation and percussion, was

negative for pain.

 

LABORATORY DATA:

The patient's radiology evaluation:  Her x-rays are reviewed and show positive

kyphoplasty fracture stabilization of T12.  There is significant osteoporosis

noted in the lower lumbar spine area and also degenerative arthritis in the

lower lumbar spine area.

 

OVERALL IMPRESSION:

1. Low back pain with radiation to the buttocks.

2. Status post kyphoplasty fracture stabilization of the T12 vertebral body.

3. Osteoporosis of the spinal column.

 

PLAN:

The patient will be placed on a rest program.  I feel we will need to re-

evaluate for the possibility of a new compression fracture having developed in

the lumbar spine area.  The patient had a previous MRI that had shown severe

spinal stenosis of the L3, L4, and L5 levels.  Also, with the continued pain in

and around the pelvis region extending out to the buttocks, we will order a

nuclear medicine bone scan to evaluate the sacroiliac

joints and also the pelvic girdle region to make sure there are no stress

fracture-type problems at those levels.

 

DD:  02/14/2018 09:04:26

DT:  02/14/2018 16:18:50  MMODAL

Job #:  [id number]

## 2018-02-23 NOTE — HP
DATE OF ADMISSION:  02/17/2018

 

HISTORY:

This is the second orthopedic outpatient admission for surgery for this 86-year-

old female who suffered severe increasing pain in her thoracic lumbar spine

beginning in 02/10/2018.  The pain increased to the point where patient was seen

through the emergency room, admitted to the hospital for severe pain and for

pain control, underwent MRI evaluation which identified 2 new acute compression

fractures at T11 and L1 with L1 showing localized blood formation indicating a

soft and unstable vertebral body at L1.  With the patient continuing to have

severe pain even after discharge, the patient is now being scheduled for an

outpatient surgical kyphoplasty fracture stabilization of T11 and L1.  The

procedures were outlined to her.  She understands procedure and has consented to

it.

 

ALLERGIES:

To penicillin, develops a rash; Paxil and clindamycin.

 

PAST MEDICAL HISTORY:

The patient has medical problems with decreased thyroid function.  She has a

cholesterol problem, atrial fibrillation, occasional high blood pressure, and

currently arthritis and osteoporosis.

 

CURRENT MEDICATIONS:

Current medications include thyroid, Flexeril, tramadol for pain, Celebrex 100

mg, MiraLAX, metoprolol, and multivitamins including vitamin D and calcium.

 

PAST SURGICAL HISTORY:

The patient has a positive surgical history.  She has had multiple surgeries.

There is no anesthesia problem with a recent kyphoplasty on 01/18/2018 for T12

and had no anesthesia problems.

 

Bleeding history is negative.  Blood clot history is negative.

 

SOCIAL HISTORY:

She is a nonsmoker and nondrinker.

 

PHYSICAL EXAMINATION:

GENERAL:  Today, reveals a well-developed, well-nourished 86-year-old female in

severe distress.

HEAD, EYES, EARS, NOSE, AND THROAT:  Normocephalic.

NECK:  Supple.

CHEST:  Clear.

COR:  Shows heart irregularly irregular secondary to the atrial fibrillation.

ABDOMEN:  Soft.

:  Intact.

SPINE:  Examination of the thoracic lumbar spine reveals severe pain to

percussion and palpation of the lower thoracic and upper lumbar spine.

 

RADIOGRAPHIC STUDIES:

MRI evaluation on 02/14/2018 shows acute compression fractures of T11 and L1.

 

ASSESSMENT:

Overall impression is acute osteoporotic compression fractures T11, L1 with

intractable pain failed treatment.

 

PLAN:

Plan is for the patient undergo a surgical kyphoplasty fracture stabilization.

 

DD:  02/22/2018 14:03:38

DT:  02/22/2018 20:44:09  MMODAL

Job #:  [id number]

## 2018-02-27 ENCOUNTER — HOSPITAL ENCOUNTER (OUTPATIENT)
Dept: HOSPITAL 41 - JD.SDS | Age: 83
Discharge: HOME | End: 2018-02-27
Attending: SPECIALIST
Payer: MEDICARE

## 2018-02-27 DIAGNOSIS — Z88.1: ICD-10-CM

## 2018-02-27 DIAGNOSIS — Z79.899: ICD-10-CM

## 2018-02-27 DIAGNOSIS — M80.08XA: Primary | ICD-10-CM

## 2018-02-27 DIAGNOSIS — Z88.0: ICD-10-CM

## 2018-02-27 DIAGNOSIS — Z88.8: ICD-10-CM

## 2018-02-27 DIAGNOSIS — I10: ICD-10-CM

## 2018-02-27 DIAGNOSIS — I48.91: ICD-10-CM

## 2018-02-27 PROCEDURE — 22513 PERQ VERTEBRAL AUGMENTATION: CPT

## 2018-02-27 PROCEDURE — C1713 ANCHOR/SCREW BN/BN,TIS/BN: HCPCS

## 2018-02-27 PROCEDURE — 22514 PERQ VERTEBRAL AUGMENTATION: CPT

## 2018-02-27 NOTE — [id number]
Post Anesthesia Note





- EVALUATION WITHIN 48HRS OF ANESTHETIC


Vital Signs in Normal Range: Yes


Patient Participated in Evaluation: Yes


Respiratory Function Stable: Yes


Airway Patent: Yes


Cardiovascular Function Stable: Yes


Hydration Status Stable: Yes


Pain Control Satisfactory: Yes


Nausea and Vomiting Control Satisfactory: Yes


Mental Status Recovered: Yes


Pulse Rate: 89


SaO2: 100


Resp Rate: 16


Temperature: 36.6 C


Blood Pressure: 110/70

## 2018-02-27 NOTE — PCM.PREANE
Preanesthetic Assessment





- Anesthesia/Transfusion/Family Hx


Anesthesia History: Prior Anesthesia Without Reaction


Family History of Anesthesia Reaction: No


Transfusion History: No Prior Transfusion(s)


Intubation History: Unknown





- Review of Systems


General: No Symptoms


Pulmonary: No Symptoms


Cardiovascular: No Symptoms


Gastrointestinal: No Symptoms


Neurological: Difficulty Walking, Weakness, Gait Disturbance, Other (Back Pain 

with movement)


Other: Reports: Thyroid Problems





- Physical Assessment


NPO Status Date: 02/26/18


NPO Status Time: 20:00


O2 Sat by Pulse Oximetry: 98


Respiratory Rate: 16


Vital Signs: 





 Last Vital Signs











Temp  36.4 C   02/27/18 06:35


 


Pulse  69   02/27/18 06:35


 


Resp  16   02/27/18 06:35


 


BP  156/85 H  02/27/18 06:35


 


Pulse Ox  98   02/27/18 06:35











Height: 1.45 m


Weight: 44.906 kg


ASA Class: 3


Mental Status: Alert & Oriented x3


Airway Class: Mallampati = 2


Dentition: Reports: Implants


Thyro-Mental Finger Breadths: 3


Mouth Opening Finger Breadths: 3


ROM/Head Extension: Full


Lungs: Clear to Auscultation, Normal Respiratory Effort


Cardiovascular: Irregular Rhythm





- Allergies


Allergies/Adverse Reactions: 


 Allergies











Allergy/AdvReac Type Severity Reaction Status Date / Time


 


paroxetine [From Paxil] Allergy  Cannot Verified 02/12/18 16:56





   Remember  


 


Penicillins Allergy  Cannot Verified 02/12/18 16:56





   Remember  


 


aspirin AdvReac  Stomach Verified 02/12/18 16:56





   Upset  


 


clindamycin AdvReac  Weakness Verified 02/14/18 08:13


 


procaine [From Novocain] AdvReac  Tachycardia Verified 02/12/18 16:56














- Acknowledgements


Anesthesia Type Planned: MAC


Pt an Appropriate Candidate for the Planned Anesthesia: Yes


Alternatives and Risks of Anesthesia Discussed w Pt/Guardian: Yes


Pt/Guardian Understands and Agrees with Anesthesia Plan: Yes





PreAnesthesia Questionnaire


HEENT History: Reports: Impaired Vision


Cardiovascular History: Reports: Afib, High Cholesterol, Hypertension, Other (

See Below)


Other Cardiovascular History: tachycardia


Respiratory History: Reports: None


Gastrointestinal History: Reports: Irritable Bowel Syndrome


OB/GYN History: Reports: None


Musculoskeletal History: Reports: Arthritis, Other (See Below)


Other Musculoskeletal History: compression fracture


Neurological History: Reports: None


Psychiatric History: Reports: Anxiety


Endocrine/Metabolic History: Reports: Hypothyroidism


Hematologic History: Reports: Anemia


Immunologic History: Reports: None


Oncologic (Cancer) History: Reports: None


Dermatologic History: Reports: None





- Past Surgical History


Head Surgeries/Procedures: Reports: None


HEENT Surgical History: Reports: Cataract Surgery, Other (See Below)


Other HEENT Surgeries/Procedures: salivary stone extraction


Cardiovascular Surgical History: Reports: Varicose


Respiratory Surgical History: Reports: None


GI Surgical History: Reports: Appendectomy


Female  Surgical History: Reports: Breast Biopsy, Hysterectomy, Oophorectomy, 

Other (See Below)


Other Female  Surgeries/Procedures: breast lumpectomy, lymph node removed 

from left arm


Endocrine Surgical History: Reports: None, Thyroidectomy


Other Endocrine Surgeries/Procedures: Thyriod surgery


Neurological Surgical History: Reports: None


Other Neurological Surgeries/Procedures: kyphoplasty


Musculoskeletal Surgical History: Reports: Other (See Below)


Other Musculoskeletal Surgeries/Procedures:: kyphoplasty


Oncologic Surgical History: Reports: None


Dermatological Surgical History: Reports: None





- SUBSTANCE USE


Smoking Status *Q: Never Smoker


Tobacco Use Within Last Twelve Months: No


Second Hand Smoke Exposure: No


Recreational Drug Use History: No





- HOME MEDS


Home Medications: 


 Home Meds





Cyclobenzaprine [Flexeril] 5 mg PO ASDIRECTED PRN 12/16/17 [History]


Flecainide Acetate 50 mg PO BID 12/16/17 [History]


Metoprolol Succinate [Toprol XL] 25 mg PO DAILY 12/16/17 [History]


traMADol [Ultram] 50 mg PO TID PRN #30 tablet 12/16/17 [Rx]


Cholecalciferol (Vitamin D3) [Vitamin D3] 1,000 unit PO DAILY 01/17/18 [History]


Omega-3S/DHA/Epa/Fish Oil [Omega-3 Fish Oil 1,000 mg Sfgl] 1 cap PO DAILY 01/17/ 18 [History]


Thyroid [[place] Thyroid] 30 mg PO DAILY 01/17/18 [History]


Calcium Carbonate [Tums Extra Strength] 1 tab PO ASDIRECTED PRN 02/12/18 [

History]


Hypromellose [Genteal Mild] 1 drop EYEBOTH DAILY 02/12/18 [History]


Celecoxib [CeleBREX] 100 mg PO BID #40 cap 02/14/18 [Rx]











- CURRENT (IN HOUSE) MEDS


Current Meds: 





 Current Medications





Lactated Ringer's (Ringers, Lactated)  1,000 mls @ 125 mls/hr IV ASDIRECTED DONNA


   Stop: 02/27/18 23:00


   Last Admin: 02/27/18 06:45 Dose:  125 mls/hr


Lidocaine/Sodium Bicarbonate (Buffered Lidocaine 1% In Ns 8.4%)  0.25 ml IDERM 

ONETIME PRN


   PRN Reason: Prior to IV Start


   Stop: 02/27/18 18:00


   Last Admin: 02/27/18 06:45 Dose:  0.25 ml


Sodium Chloride (Saline Flush)  10 ml FLUSH ASDIRECTED PRN


   PRN Reason: Keep Vein Open


   Stop: 02/27/18 18:00





Discontinued Medications





Iopamidol (Isovue-300 (61%)) Confirm Administered Dose 50 ml .ROUTE .STK-MED ONE


   Stop: 02/27/18 06:55


Lidocaine/Epinephrine (Xylocaine 1% With Epinephrine 1:100,000) Confirm 

Administered Dose 40 ml .ROUTE .STK-MED ONE


   Stop: 02/27/18 06:55


Sodium Chloride (Normal Saline) Confirm Administered Dose 50 ml .ROUTE .STK-MED 

ONE


   Stop: 02/27/18 06:55

## 2018-02-27 NOTE — CR
Thoracolumbar spine: Six fluoroscopic spot views were obtained at the 

thoracolumbar junction during vertebroplasty procedure.  Study 

obtained utilizing C-arm device.

 

Study shows vertebroplasty procedures within T11, T12 and L1.  T12 

vertebroplasty is old.  Other vertebroplasties are new.  Fluoroscopy 

time given as 462.4 seconds.

 

Impression:

1.  Procedural study as noted above.

 

Diagnostic code #2

## 2018-02-28 NOTE — OR
DATE OF OPERATION:  02/27/2018

 

SURGEON:  Ronald D Isackson, MD

 

PREOPERATIVE DIAGNOSIS:

1. Severe painful osteoporotic compression fractures of T11.

2. Severe osteoporotic compression fracture of L1 with intractable pain.

 

POSTOPERATIVE DIAGNOSIS:

1. Severe painful osteoporotic compression fractures of T11.

2. Severe osteoporotic compression fracture of L1 with intractable pain.

 

ANESTHESIA:

Sedation with local anesthetic.

 

OPERATION PERFORMED:

1. Kyphoplasty fracture stabilization of T11 vertebral body.

2. Biopsy of T11 vertebral body.

3. Kyphoplasty fracture stabilization, L1 vertebral body.

4. Biopsy of L1 vertebral body.

 

DESCRIPTION OF PROCEDURE:

The patient was taken to the operative room in a supine position, was placed

under a light sedation, and then transferred to the operating table in a prone

position.  Once the patient was positioned on the operating room table for

fluoroscopy, for approach to the T11-L1 area, the operation proceeded with

prepping and draping by standard technique.  After prepping and draping, the

operation proceeded to the L1 level for the stabilization.  The pedicles were

then identified and marked on the skin.  Then, local infiltration was carried

out with 1% lidocaine and epinephrine on both sides through the skin down to the

pedicle area.  Once that was completed, stab incisions were used, right and left

side.  The kyphoplasty instrumentation cannula was then introduced on both the

right and left sides and advanced to the posterior vertebral wall.  Biopsies

were taken of the vertebra itself.  In the course of the biopsy, suction brought

out approximately 2.5 mL of serosanguineous fluid from the main body of the

vertebral body, which was evident on the MRI.  Once that was evacuated, the

operation proceeded with placement of cement.  6 mL of cement was placed in the

vertebral body.  It was noted that the cement would localize to the anterior-

superior area, where the cyst had formed, and the bulk of the cement appeared to

be in that area to support the vertebral body itself.  Once the cement was in

place and hardened, the instruments were withdrawn.  Good filling was obtained

in both AP and lateral.  The operation then proceeded to the T11 site.  The

pedicles were then marked, and the skin was then infiltrated with 1% lidocaine

with epinephrine.  The operation then proceeded with stab incisions being used

and then the kyphoplasty cannula was introduced into the pedicle on the right

and left sides through the pedicle to the posterior vertebral wall.  Biopsies

were then taken of the T11 vertebra, which was a little bit more stable, but

still very soft as compared to the L1.  The operation then proceeded with

placement of 4 mL of cement and what immediately happened on introduction of the

cement, there was posterior extravasation of the cement with the second mL was

applied on the left side, that extended to just under the posterior longitudinal

ligament.  It was opted to stop any more filling of cement.  Maximal 4 mL was

placed in the vertebral body itself giving a good fill.  Once the cement was

hardened, the cannulas were then withdrawn.  The operation proceeded with hard

copy x-rays being taken in both AP and lateral.  Once that was completed, the

skin was then closed with 3-0 Prolene.  Standard dressings were applied.  The

patient tolerated this procedure well.  She left the operating room in a stable

condition to room for recovery.

 

ESTIMATED BLOOD LOSS:

 

 

DD:  02/27/2018 09:28:50

DT:  02/27/2018 11:04:43  MMODAL

Job #:  [id number]